# Patient Record
Sex: MALE | Race: BLACK OR AFRICAN AMERICAN | NOT HISPANIC OR LATINO | Employment: FULL TIME | URBAN - METROPOLITAN AREA
[De-identification: names, ages, dates, MRNs, and addresses within clinical notes are randomized per-mention and may not be internally consistent; named-entity substitution may affect disease eponyms.]

---

## 2017-09-28 ENCOUNTER — HOSPITAL ENCOUNTER (EMERGENCY)
Facility: OTHER | Age: 37
Discharge: HOME | End: 2017-09-28
Attending: EMERGENCY MEDICINE

## 2017-09-28 VITALS
RESPIRATION RATE: 18 BRPM | HEIGHT: 71 IN | SYSTOLIC BLOOD PRESSURE: 114 MMHG | TEMPERATURE: 99 F | BODY MASS INDEX: 33.6 KG/M2 | DIASTOLIC BLOOD PRESSURE: 75 MMHG | HEART RATE: 57 BPM | WEIGHT: 240 LBS | OXYGEN SATURATION: 96 %

## 2017-09-28 DIAGNOSIS — M54.42 ACUTE BILATERAL LOW BACK PAIN WITH LEFT-SIDED SCIATICA: Primary | ICD-10-CM

## 2017-09-28 PROCEDURE — 99283 EMERGENCY DEPT VISIT LOW MDM: CPT

## 2017-09-28 RX ORDER — METHOCARBAMOL 500 MG/1
1000 TABLET, FILM COATED ORAL 4 TIMES DAILY PRN
Qty: 30 TABLET | Refills: 0 | Status: SHIPPED | OUTPATIENT
Start: 2017-09-28 | End: 2017-10-03

## 2017-09-28 RX ORDER — DICLOFENAC SODIUM 50 MG/1
50 TABLET, DELAYED RELEASE ORAL 3 TIMES DAILY PRN
Qty: 24 TABLET | Refills: 0 | Status: SHIPPED | OUTPATIENT
Start: 2017-09-28 | End: 2018-03-22

## 2017-09-28 NOTE — ED PROVIDER NOTES
Encounter Date: 9/28/2017       History     Chief Complaint   Patient presents with    Back Pain     radiates down left leg, relieved with rest      A 37-year-old male who presents to the ED with left lower back pain radiates into left thigh ×1 week.  Patient reports some tingling to left thigh.  He denies numbness.  Patient denies injury.  Patient denies bowel or bladder incontinence.  Pain is a 6/10.  Pain is relieved with rest.  Pain is described as a sharp pain.          Review of patient's allergies indicates:  No Known Allergies  Past Medical History:   Diagnosis Date    Hypertension      History reviewed. No pertinent surgical history.  Family History   Problem Relation Age of Onset    Hypertension Father      Social History   Substance Use Topics    Smoking status: Never Smoker    Smokeless tobacco: Never Used    Alcohol use Yes      Comment: socailly      Review of Systems   Constitutional: Negative.  Negative for activity change, appetite change and fever.   HENT: Negative.  Negative for congestion.    Eyes: Negative.    Respiratory: Negative.  Negative for chest tightness and shortness of breath.    Gastrointestinal: Negative for abdominal distention and constipation.   Endocrine: Negative.    Genitourinary: Negative.  Negative for decreased urine volume, difficulty urinating, dysuria and penile swelling.   Musculoskeletal: Positive for back pain.   Skin: Negative.    Neurological: Negative.  Negative for dizziness, tremors and weakness.   Psychiatric/Behavioral: Negative.    All other systems reviewed and are negative.      Physical Exam     Initial Vitals [09/28/17 0944]   BP Pulse Resp Temp SpO2   114/75 (!) 57 18 98.5 °F (36.9 °C) 96 %      MAP       88         Physical Exam    Nursing note and vitals reviewed.  Constitutional: Vital signs are normal. He appears well-developed.  Non-toxic appearance. He does not have a sickly appearance.   HENT:   Head: Normocephalic.   Right Ear: External ear  and ear canal normal.   Left Ear: External ear and ear canal normal.   Nose: Nose normal.   Mouth/Throat: Uvula is midline, oropharynx is clear and moist and mucous membranes are normal.   Eyes: Conjunctivae are normal. Pupils are equal, round, and reactive to light.   Neck: Normal range of motion.   Cardiovascular: Normal rate, regular rhythm and normal heart sounds.   Pulmonary/Chest: Effort normal.   Abdominal: Soft. Normal appearance and bowel sounds are normal. He exhibits no distension. There is no tenderness. There is no rebound.   Musculoskeletal:        Lumbar back: He exhibits tenderness and pain.   Full range of motion of all extremities.  Lumbar spine with full range of motion with pain.  Positive paraspinous tenderness on the left.,  Straight leg raise positive on the left.  Reflexes +2 bilaterally  Distal pulse +2 bilaterally   Neurological: He is alert and oriented to person, place, and time. He has normal strength. Gait normal.   Skin: Skin is warm and dry. Capillary refill takes less than 2 seconds.   Psychiatric: He has a normal mood and affect. His speech is normal and behavior is normal. Judgment and thought content normal. Cognition and memory are normal.         ED Course   Procedures  Labs Reviewed - No data to display          Medical Decision Making:   Initial Assessment:   A 37-year-old male who presents to the ED with lower back pain that radiates into left eye ×1 week.  Patient reports some tingling to left thigh.  He denies numbness.  No bowel or bladder incontinence.  No evidence of saddle anesthesia.  Patient denies injury.  Patient states he was seen at another ER and given an injection last week but did not wait on his prescriptions.  Pain is a 6 out of 10.  Pain is described as a sharp pain.  Pain is relieved with rest.   Differential Diagnosis:   Acute sciatica, lumbar sprain.  ED Management:  Physical exam.  Patient offered injection.  Patient declined.  Patient states he rather  be sent home with prescriptions.   Patient to be discharged home with diclofenac and robaxin. Follow-up with PCP in 2-3 days.               Attending Attestation:     Physician Attestation Statement for NP/PA:   I discussed this assessment and plan of this patient with the NP/PA, but I did not personally examine the patient. The face to face encounter was performed by the NP/PA.                  ED Course      Clinical Impression:   The encounter diagnosis was Acute bilateral low back pain with left-sided sciatica.                           GENE Zelaya  09/28/17 1151       Janice Tomlinson MD  09/28/17 1200

## 2018-02-09 ENCOUNTER — CLINICAL SUPPORT (OUTPATIENT)
Dept: URGENT CARE | Facility: CLINIC | Age: 38
End: 2018-02-09

## 2018-02-09 DIAGNOSIS — Z02.83 ENCOUNTER FOR DRUG SCREENING: Primary | ICD-10-CM

## 2018-02-09 PROCEDURE — 80305 DRUG TEST PRSMV DIR OPT OBS: CPT | Mod: S$GLB,,, | Performed by: PREVENTIVE MEDICINE

## 2018-11-14 ENCOUNTER — HOSPITAL ENCOUNTER (EMERGENCY)
Facility: HOSPITAL | Age: 38
Discharge: HOME | End: 2018-11-14
Attending: EMERGENCY MEDICINE

## 2018-11-14 VITALS
SYSTOLIC BLOOD PRESSURE: 158 MMHG | BODY MASS INDEX: 36.11 KG/M2 | DIASTOLIC BLOOD PRESSURE: 79 MMHG | TEMPERATURE: 98 F | OXYGEN SATURATION: 100 % | HEART RATE: 68 BPM | HEIGHT: 69 IN | RESPIRATION RATE: 20 BRPM | WEIGHT: 243.81 LBS

## 2018-11-14 DIAGNOSIS — R30.0 DYSURIA: Primary | ICD-10-CM

## 2018-11-14 LAB
BILIRUBIN, POC UA: NEGATIVE
BLOOD, POC UA: NEGATIVE
CLARITY, POC UA: CLEAR
COLOR, POC UA: YELLOW
GLUCOSE, POC UA: NEGATIVE
KETONES, POC UA: NEGATIVE
LEUKOCYTE EST, POC UA: NEGATIVE
NITRITE, POC UA: NEGATIVE
PH UR STRIP: 6.5 [PH]
PROTEIN, POC UA: NEGATIVE
SPECIFIC GRAVITY, POC UA: 1.02
UROBILINOGEN, POC UA: 0.2 E.U./DL

## 2018-11-14 PROCEDURE — 87661 TRICHOMONAS VAGINALIS AMPLIF: CPT

## 2018-11-14 PROCEDURE — 25000003 PHARM REV CODE 250: Performed by: NURSE PRACTITIONER

## 2018-11-14 PROCEDURE — 96372 THER/PROPH/DIAG INJ SC/IM: CPT

## 2018-11-14 PROCEDURE — 99284 EMERGENCY DEPT VISIT MOD MDM: CPT | Mod: 25

## 2018-11-14 PROCEDURE — 99283 EMERGENCY DEPT VISIT LOW MDM: CPT | Mod: 25

## 2018-11-14 PROCEDURE — 81003 URINALYSIS AUTO W/O SCOPE: CPT

## 2018-11-14 PROCEDURE — 63600175 PHARM REV CODE 636 W HCPCS: Performed by: NURSE PRACTITIONER

## 2018-11-14 PROCEDURE — 87491 CHLMYD TRACH DNA AMP PROBE: CPT

## 2018-11-14 RX ORDER — AZITHROMYCIN 250 MG/1
1000 TABLET, FILM COATED ORAL ONCE
Status: COMPLETED | OUTPATIENT
Start: 2018-11-14 | End: 2018-11-14

## 2018-11-14 RX ORDER — CEFTRIAXONE 250 MG/1
250 INJECTION, POWDER, FOR SOLUTION INTRAMUSCULAR; INTRAVENOUS
Status: COMPLETED | OUTPATIENT
Start: 2018-11-14 | End: 2018-11-14

## 2018-11-14 RX ADMIN — AZITHROMYCIN MONOHYDRATE 1000 MG: 250 TABLET ORAL at 04:11

## 2018-11-14 RX ADMIN — CEFTRIAXONE SODIUM 250 MG: 250 INJECTION, POWDER, FOR SOLUTION INTRAMUSCULAR; INTRAVENOUS at 04:11

## 2018-11-14 NOTE — ED PROVIDER NOTES
Encounter Date: 11/14/2018       History     Chief Complaint   Patient presents with    Urinary Frequency     onset x 2 days with right flank pain that resolved.  Denies any fever, n/v, diarrhea, chills.  Denies taking anything for symptoms.     A 38-year-old female who presents to the ED with complaints of urinary frequency and dysuria x2 days.  Patient reports right flank pain that resolved.  Patient denies fever, chills nausea vomiting.  Patient reports having unprotected sex.  Patient denies penile discharge.      The history is provided by the patient.   Urinary Frequency   This is a new problem. The current episode started 2 days ago. The problem has been gradually worsening. Pertinent negatives include no abdominal pain. Nothing aggravates the symptoms. Nothing relieves the symptoms.     Review of patient's allergies indicates:  No Known Allergies  Past Medical History:   Diagnosis Date    Hypertension      No past surgical history on file.  Family History   Problem Relation Age of Onset    Hypertension Father      Social History     Tobacco Use    Smoking status: Never Smoker    Smokeless tobacco: Never Used   Substance Use Topics    Alcohol use: Yes     Comment: socailly     Drug use: No     Review of Systems   Constitutional: Negative.  Negative for activity change.   HENT: Negative.  Negative for congestion.    Eyes: Negative.  Negative for discharge.   Respiratory: Negative.    Cardiovascular: Negative.    Gastrointestinal: Negative.  Negative for abdominal pain, nausea and vomiting.   Genitourinary: Positive for dysuria and frequency.   Musculoskeletal: Negative.    Skin: Negative.    Neurological: Negative.  Negative for weakness.   Hematological: Does not bruise/bleed easily.   Psychiatric/Behavioral: Negative.    All other systems reviewed and are negative.      Physical Exam     Initial Vitals [11/14/18 1553]   BP Pulse Resp Temp SpO2   (!) 142/72 77 18 97.6 °F (36.4 °C) 98 %      MAP        --         Physical Exam    Nursing note and vitals reviewed.  Constitutional: Vital signs are normal. He appears well-developed and well-nourished.   HENT:   Right Ear: External ear normal.   Left Ear: External ear normal.   Nose: Nose normal.   Mouth/Throat: Oropharynx is clear and moist.   Eyes: Conjunctivae and lids are normal. Pupils are equal, round, and reactive to light.   Neck: Normal range of motion. Neck supple.   Cardiovascular: Normal rate, regular rhythm, S1 normal, S2 normal and normal heart sounds.   Pulmonary/Chest: Effort normal and breath sounds normal.   Abdominal: Soft. Normal appearance and bowel sounds are normal. There is no tenderness.   Genitourinary: Penis normal. Right testis shows no tenderness. Cremasteric reflex is not absent on the right side. Left testis shows no tenderness. Cremasteric reflex is not absent on the left side. Uncircumcised. No penile tenderness. No discharge found.   Genitourinary Comments: Chaperone present   Musculoskeletal: Normal range of motion.   Neurological: He is alert and oriented to person, place, and time. He has normal strength.   Skin: Skin is warm, dry and intact.   Psychiatric: He has a normal mood and affect. His speech is normal and behavior is normal. Judgment normal.         ED Course   Procedures  Labs Reviewed   POCT URINALYSIS W/O SCOPE - Abnormal; Notable for the following components:       Result Value    Glucose, UA Negative (*)     Bilirubin, UA Negative (*)     Ketones, UA Negative (*)     Blood, UA Negative (*)     Protein, UA Negative (*)     Nitrite, UA Negative (*)     Leukocytes, UA Negative (*)     All other components within normal limits   C. TRACHOMATIS/N. GONORRHOEAE BY AMP DNA   TRICHOMONAS VAGINALIS, RNA,QUAL, URINE   POCT URINALYSIS W/O SCOPE   POCT GLUCOSE   POCT GLUCOSE          Imaging Results    None          Medical Decision Making:   Initial Assessment:   A 38-year-old male who presents to the ED with complaints of  urinary frequency and dysuria  x2 days.  Patient reports having unprotected sex.  Patient denies penile discharge  Differential Diagnosis:   Urinary tract infection, STD exposure  Clinical Tests:   Lab Tests: Ordered and Reviewed  ED Management:  Physical exam.  Urinalysis.  GC chlamydia pending.  Medicated with Ceftriaxone 250 mg IM and Azithromycin 1 g p.o..  Follow-up with PCP in 1-2 days.  Return ED for worsening of symptoms.                      Clinical Impression:   The encounter diagnosis was Dysuria.                             GENE Zelaya  11/14/18 8048

## 2018-11-15 LAB
C TRACH DNA SPEC QL NAA+PROBE: NOT DETECTED
N GONORRHOEA DNA SPEC QL NAA+PROBE: NOT DETECTED
POCT GLUCOSE: 108 MG/DL (ref 70–110)

## 2018-11-19 LAB
T VAGINALIS RRNA SPEC QL NAA+PROBE: NOT DETECTED
TRICHOMONAS VAGINALIS RNA, QUAL, SOURCE: NORMAL

## 2018-11-21 ENCOUNTER — HOSPITAL ENCOUNTER (EMERGENCY)
Facility: HOSPITAL | Age: 38
Discharge: HOME | End: 2018-11-21
Attending: EMERGENCY MEDICINE

## 2018-11-21 VITALS
RESPIRATION RATE: 18 BRPM | TEMPERATURE: 98 F | OXYGEN SATURATION: 98 % | HEIGHT: 70 IN | BODY MASS INDEX: 34.79 KG/M2 | HEART RATE: 64 BPM | SYSTOLIC BLOOD PRESSURE: 132 MMHG | DIASTOLIC BLOOD PRESSURE: 78 MMHG | WEIGHT: 243 LBS

## 2018-11-21 DIAGNOSIS — R35.0 URINARY FREQUENCY: Primary | ICD-10-CM

## 2018-11-21 LAB
BILIRUBIN, POC UA: NEGATIVE
BLOOD, POC UA: NEGATIVE
CLARITY, POC UA: CLEAR
COLOR, POC UA: YELLOW
GLUCOSE, POC UA: NEGATIVE
KETONES, POC UA: NEGATIVE
LEUKOCYTE EST, POC UA: NEGATIVE
NITRITE, POC UA: NEGATIVE
PH UR STRIP: 5.5 [PH]
PROTEIN, POC UA: NEGATIVE
SPECIFIC GRAVITY, POC UA: 1.02
UROBILINOGEN, POC UA: 0.2 E.U./DL

## 2018-11-21 PROCEDURE — 99283 EMERGENCY DEPT VISIT LOW MDM: CPT

## 2018-11-21 RX ORDER — SULFAMETHOXAZOLE AND TRIMETHOPRIM 800; 160 MG/1; MG/1
1 TABLET ORAL 2 TIMES DAILY
Qty: 20 TABLET | Refills: 0 | Status: SHIPPED | OUTPATIENT
Start: 2018-11-21 | End: 2019-01-10 | Stop reason: SDUPTHER

## 2018-11-21 NOTE — ED PROVIDER NOTES
Encounter Date: 11/21/2018    SCRIBE #1 NOTE: I, Kayla Fabianmalinda, am scribing for, and in the presence of,  Toussaint Battley NP. I have scribed the following portions of the note - Other sections scribed: HPI, ROS, PE.       History     Chief Complaint   Patient presents with    Urinary Frequency     patient reports having urinary frequency.  patient reports he was treated here a week ago for the same complaint     38 y.o male presents with recurrent increased urinary frequency for 1 week. He was treated here for the same complaint last week and had normal labs. He also reports when he goes, only drops come out occasionally. He has a family hx of prostate cancer (uncle). Was treated for prostatitis several years ago.  Reports he feels now like he did when he was dx.  He denies any other symptoms.       The history is provided by the patient. No  was used.     Review of patient's allergies indicates:  No Known Allergies  Past Medical History:   Diagnosis Date    Hypertension      History reviewed. No pertinent surgical history.  Family History   Problem Relation Age of Onset    Hypertension Father      Social History     Tobacco Use    Smoking status: Never Smoker    Smokeless tobacco: Never Used   Substance Use Topics    Alcohol use: Yes     Comment: socailly     Drug use: No     Review of Systems   Constitutional: Negative for fever.   HENT: Negative for sore throat.    Respiratory: Negative for shortness of breath.    Cardiovascular: Negative for chest pain.   Gastrointestinal: Negative for abdominal pain and nausea.   Genitourinary: Positive for difficulty urinating and frequency. Negative for dysuria.   Musculoskeletal: Negative for back pain.   Skin: Negative for rash.   Neurological: Negative for weakness.   Hematological: Does not bruise/bleed easily.   All other systems reviewed and are negative.      Physical Exam     Initial Vitals [11/21/18 1339]   BP Pulse Resp Temp SpO2    133/89 63 18 97.7 °F (36.5 °C) 96 %      MAP       --         Physical Exam    Nursing note and vitals reviewed.  Constitutional: He appears well-developed and well-nourished. He is not diaphoretic.  Non-toxic appearance. He does not appear ill. No distress.   HENT:   Head: Normocephalic and atraumatic.   Eyes: Conjunctivae and EOM are normal.   Neck: Normal range of motion. Neck supple.   Cardiovascular: Normal rate, regular rhythm and normal heart sounds. Exam reveals no gallop and no friction rub.    No murmur heard.  Pulmonary/Chest: Breath sounds normal. No respiratory distress. He has no wheezes. He has no rhonchi. He has no rales. He exhibits no tenderness.   Genitourinary:   Genitourinary Comments: Patient refused prostate exam   Musculoskeletal: Normal range of motion.   Neurological: He is alert and oriented to person, place, and time. No cranial nerve deficit or sensory deficit.   Skin: Skin is warm and dry. No rash noted.   Psychiatric: He has a normal mood and affect. His behavior is normal. Judgment and thought content normal.         ED Course   Procedures  Labs Reviewed   POCT URINALYSIS W/O SCOPE - Abnormal; Notable for the following components:       Result Value    Glucose, UA Negative (*)     Bilirubin, UA Negative (*)     Ketones, UA Negative (*)     Blood, UA Negative (*)     Protein, UA Negative (*)     Nitrite, UA Negative (*)     Leukocytes, UA Negative (*)     All other components within normal limits   POCT URINALYSIS W/O SCOPE          Imaging Results    None          Medical Decision Making:   History:   Old Medical Records: I decided to obtain old medical records.  Old Records Summarized: records from previous admission(s).  Initial Assessment:   Urinary frequency  Differential Diagnosis:   STD, prostatitis  Clinical Tests:   Lab Tests: Ordered and Reviewed  ED Management:  Patient was seen in the ER last week and treated for STDs.  Gonorrhea Chlamydia cultures came back negative.   Patient will be empirically treated for prostatitis with Bactrim DS per [place] antimicrobial guidelines with instructions to refrain from 6 x 1 week, follow up with , [facility], to establish primary care within 1 week, and return to the ER as needed if symptoms worsen or fail to improve.  The patient verbalized understanding of discharge instruction and treatment plan.            Scribe Attestation:   Scribe #1: I performed the above scribed service and the documentation accurately describes the services I performed. I attest to the accuracy of the note.               Clinical Impression:     1. Urinary frequency                               Toussaint Battley III, [facility]  11/21/18 0819

## 2018-11-21 NOTE — ED NOTES
"Pt states that he was referred to urologist but states he "didn't think it was a big deal".  And never followed up, pt states his father had prostate ca with successful surgery  "

## 2019-07-30 ENCOUNTER — HOSPITAL ENCOUNTER (EMERGENCY)
Facility: HOSPITAL | Age: 39
Discharge: HOME | End: 2019-07-30
Attending: EMERGENCY MEDICINE
Payer: COMMERCIAL

## 2019-07-30 VITALS
DIASTOLIC BLOOD PRESSURE: 79 MMHG | WEIGHT: 250 LBS | BODY MASS INDEX: 37.03 KG/M2 | TEMPERATURE: 99 F | RESPIRATION RATE: 18 BRPM | HEIGHT: 69 IN | OXYGEN SATURATION: 100 % | HEART RATE: 71 BPM | SYSTOLIC BLOOD PRESSURE: 128 MMHG

## 2019-07-30 DIAGNOSIS — R52 PAIN: ICD-10-CM

## 2019-07-30 DIAGNOSIS — T14.8XXA ABRASION: ICD-10-CM

## 2019-07-30 DIAGNOSIS — S80.12XA CONTUSION OF MULTIPLE SITES OF LEFT LOWER EXTREMITY, INITIAL ENCOUNTER: Primary | ICD-10-CM

## 2019-07-30 PROCEDURE — 63600175 PHARM REV CODE 636 W HCPCS: Mod: ER | Performed by: NURSE PRACTITIONER

## 2019-07-30 PROCEDURE — 25000003 PHARM REV CODE 250: Mod: ER | Performed by: NURSE PRACTITIONER

## 2019-07-30 PROCEDURE — 90471 IMMUNIZATION ADMIN: CPT | Mod: ER | Performed by: NURSE PRACTITIONER

## 2019-07-30 PROCEDURE — 99284 EMERGENCY DEPT VISIT MOD MDM: CPT | Mod: ER

## 2019-07-30 PROCEDURE — 90715 TDAP VACCINE 7 YRS/> IM: CPT | Mod: ER | Performed by: NURSE PRACTITIONER

## 2019-07-30 RX ORDER — IBUPROFEN 600 MG/1
600 TABLET ORAL ONCE
Status: COMPLETED | OUTPATIENT
Start: 2019-07-30 | End: 2019-07-30

## 2019-07-30 RX ORDER — MELOXICAM 15 MG/1
15 TABLET ORAL DAILY PRN
Qty: 20 TABLET | Refills: 0 | Status: SHIPPED | OUTPATIENT
Start: 2019-07-30

## 2019-07-30 RX ADMIN — BACITRACIN ZINC, NEOMYCIN SULFATE, POLYMYXIN B SULFATE 1 EACH: 3.5; 5000; 4 OINTMENT TOPICAL at 02:07

## 2019-07-30 RX ADMIN — IBUPROFEN 600 MG: 600 TABLET ORAL at 02:07

## 2019-07-30 RX ADMIN — CLOSTRIDIUM TETANI TOXOID ANTIGEN (FORMALDEHYDE INACTIVATED), CORYNEBACTERIUM DIPHTHERIAE TOXOID ANTIGEN (FORMALDEHYDE INACTIVATED), BORDETELLA PERTUSSIS TOXOID ANTIGEN (GLUTARALDEHYDE INACTIVATED), BORDETELLA PERTUSSIS FILAMENTOUS HEMAGGLUTININ ANTIGEN (FORMALDEHYDE INACTIVATED), BORDETELLA PERTUSSIS PERTACTIN ANTIGEN, AND BORDETELLA PERTUSSIS FIMBRIAE 2/3 ANTIGEN 0.5 ML: 5; 2; 2.5; 5; 3; 5 INJECTION, SUSPENSION INTRAMUSCULAR at 02:07

## 2019-07-30 NOTE — ED PROVIDER NOTES
"Encounter Date: 7/30/2019       History     Chief Complaint   Patient presents with    Leg Pain     Pt states," I hurt the lower part of my left leg five days ago at work."     HPI  Review of patient's allergies indicates:  No Known Allergies  Past Medical History:   Diagnosis Date    Hypertension      History reviewed. No pertinent surgical history.  Family History   Problem Relation Age of Onset    Hypertension Father      Social History     Tobacco Use    Smoking status: Never Smoker    Smokeless tobacco: Never Used   Substance Use Topics    Alcohol use: Yes     Comment: socailly     Drug use: No     Review of Systems    Physical Exam     Initial Vitals [07/30/19 1315]   BP Pulse Resp Temp SpO2   139/80 68 16 98 °F (36.7 °C) 99 %      MAP       --         Physical Exam    ED Course   Procedures  Labs Reviewed - No data to display       Imaging Results          X-Ray Tibia Fibula 2 View Left (Final result)  Result time 07/30/19 13:38:53    Final result by Suresh Huizar III, MD (07/30/19 13:38:53)                 Narrative:    EXAMINATION:  XR TIBIA FIBULA 2 VIEW LEFT    CLINICAL HISTORY:  Pain, unspecified    FINDINGS:  Two views: No fracture dislocation bone destruction seen.      Electronically signed by: Suresh Huizar MD  Date:    07/30/2019  Time:    13:38                                                    Clinical Impression:   {Add your Clinical Impression here. If you haven't documented one yet, please pend the note, finalize a Clinical Impression, and refresh your note before signing.:[id number]}                          "

## 2019-07-30 NOTE — ED PROVIDER NOTES
"Encounter Date: 7/30/2019    SCRIBE #1 NOTE: I, Prince Banks, am scribing for, and in the presence of,  GENE Gutierres. I have scribed the following portions of the note - Other sections scribed: HPI, ROS, PE.       History     Chief Complaint   Patient presents with    Leg Pain     Pt states," I hurt the lower part of my left leg five days ago at work."     39 year old male complaining of left lower leg pain s/p injuring it at work 5 days ago. He denies weakness or numbness at this time.  He reports falling intake can. 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  He is unsure of his tetanus status.    The history is provided by the patient. No  was used.   Leg Pain    The incident occurred at work. The incident occurred several days ago (5 days ago). The quality of the pain is described as aching. The pain is at a severity of 8/10. Pertinent negatives include no numbness, no inability to bear weight, no loss of motion, no muscle weakness, no loss of sensation and no tingling. He reports no foreign bodies present. The symptoms are aggravated by bearing weight. He has tried NSAIDs for the symptoms. The treatment provided mild relief.     Review of patient's allergies indicates:  No Known Allergies  Past Medical History:   Diagnosis Date    Hypertension      History reviewed. No pertinent surgical history.  Family History   Problem Relation Age of Onset    Hypertension Father      Social History     Tobacco Use    Smoking status: Never Smoker    Smokeless tobacco: Never Used   Substance Use Topics    Alcohol use: Yes     Comment: socailly     Drug use: No     Review of Systems   Constitutional: Negative.    HENT: Negative.    Eyes: Negative.  Negative for redness.   Respiratory: Negative.  Negative for shortness of breath.    Cardiovascular: Negative.  Negative for chest pain.   Gastrointestinal: Negative.    Endocrine: Negative.    Genitourinary: Negative.    Musculoskeletal: Positive for " arthralgias (left lower leg).   Skin: Negative.  Negative for rash and wound.   Allergic/Immunologic: Negative.    Neurological: Negative.  Negative for tingling, weakness and numbness.   Hematological: Negative.    Psychiatric/Behavioral: Negative.    All other systems reviewed and are negative.      Physical Exam     Initial Vitals [07/30/19 1315]   BP Pulse Resp Temp SpO2   139/80 68 16 98 °F (36.7 °C) 99 %      MAP       --         Physical Exam    Nursing note and vitals reviewed.  Constitutional: He appears well-developed.   HENT:   Head: Atraumatic.   Right Ear: External ear normal.   Left Ear: External ear normal.   Nose: Nose normal.   Mouth/Throat: Oropharynx is clear and moist.   Eyes: Conjunctivae are normal.   Neck: Normal range of motion. Neck supple.   Cardiovascular: Normal rate, regular rhythm, S1 normal, S2 normal and normal heart sounds. Exam reveals no gallop and no friction rub.    No murmur heard.  Pulses:       Dorsalis pedis pulses are 2+ on the right side, and 2+ on the left side.   Pulmonary/Chest: Effort normal and breath sounds normal. No respiratory distress.   Abdominal: Soft. Bowel sounds are normal.   Musculoskeletal: Normal range of motion. He exhibits no edema or tenderness.        Legs:  Abrasion to left lower leg.  No drainage. No evidence of infection.  Left lower leg with full range of motion. No swelling or deformity.   Neurological: He is alert and oriented to person, place, and time.   Skin: Skin is warm and dry. No rash noted.   Psychiatric: He has a normal mood and affect.         ED Course   Procedures  Labs Reviewed - No data to display       Imaging Results    None       Imaging Results          X-Ray Tibia Fibula 2 View Left (Final result)  Result time 07/30/19 13:38:53    Final result by Suresh Huizar III, MD (07/30/19 13:38:53)                 Narrative:    EXAMINATION:  XR TIBIA FIBULA 2 VIEW LEFT    CLINICAL HISTORY:  Pain, unspecified    FINDINGS:  Two views: No  fracture dislocation bone destruction seen.      Electronically signed by: Suresh Huizar MD  Date:    07/30/2019  Time:    13:38                                Medical Decision Making:   Initial Assessment:   39 year old male complaining of left lower leg pain s/p injuring it at work 5 days ago. He denies weakness or numbness at this time.  He reports falling intake can. 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  He is unsure of his tetanus status.    Differential Diagnosis:   Contusion, fracture, abrasion  Clinical Tests:   Radiological Study: Ordered and Reviewed  ED Management:  Adacel 0.5 mg IM.  Discharge home with diclofenac p.r.n..  Apply Neosporin daily and dressed  Follow-up with PCP in 1-2 days.  Return ED for worsening of symptoms.            Scribe Attestation:   Scribe #1: I performed the above scribed service and the documentation accurately describes the services I performed. I attest to the accuracy of the note.    This document was produced by a scribe under my direction and in my presence. I agree with the content of the note and have made any necessary edits.     GENE Gutierres    07/30/2019 2:30 PM           Clinical Impression:     1. Contusion of multiple sites of left lower extremity, initial encounter    2. Pain    3. Abrasion                                   GENE Zelaya  07/30/19 8792

## 2021-07-23 ENCOUNTER — IMMUNIZATION (OUTPATIENT)
Dept: PRIMARY CARE CLINIC | Facility: CLINIC | Age: 41
End: 2021-07-23

## 2021-07-23 DIAGNOSIS — Z23 NEED FOR VACCINATION: Primary | ICD-10-CM

## 2021-07-23 PROCEDURE — 0001A COVID-19, MRNA, LNP-S, PF, 30 MCG/0.3 ML DOSE VACCINE: CPT | Mod: CV19,,, | Performed by: FAMILY MEDICINE

## 2021-07-23 PROCEDURE — 91300 COVID-19, MRNA, LNP-S, PF, 30 MCG/0.3 ML DOSE VACCINE: CPT | Mod: ,,, | Performed by: FAMILY MEDICINE

## 2021-07-23 PROCEDURE — 91300 COVID-19, MRNA, LNP-S, PF, 30 MCG/0.3 ML DOSE VACCINE: ICD-10-PCS | Mod: ,,, | Performed by: FAMILY MEDICINE

## 2021-07-23 PROCEDURE — 0001A COVID-19, MRNA, LNP-S, PF, 30 MCG/0.3 ML DOSE VACCINE: ICD-10-PCS | Mod: CV19,,, | Performed by: FAMILY MEDICINE

## 2021-08-13 ENCOUNTER — IMMUNIZATION (OUTPATIENT)
Dept: PRIMARY CARE CLINIC | Facility: CLINIC | Age: 41
End: 2021-08-13

## 2021-08-13 DIAGNOSIS — Z23 NEED FOR VACCINATION: Primary | ICD-10-CM

## 2024-04-18 DIAGNOSIS — Z98.890 S/P ORIF (OPEN REDUCTION INTERNAL FIXATION) FRACTURE: Primary | ICD-10-CM

## 2024-04-18 DIAGNOSIS — Z87.81 S/P ORIF (OPEN REDUCTION INTERNAL FIXATION) FRACTURE: Primary | ICD-10-CM

## 2024-04-19 ENCOUNTER — CLINICAL SUPPORT (OUTPATIENT)
Dept: REHABILITATION | Facility: HOSPITAL | Age: 44
End: 2024-04-19
Payer: COMMERCIAL

## 2024-04-19 DIAGNOSIS — Z74.09 IMPAIRED FUNCTIONAL MOBILITY, BALANCE, GAIT, AND ENDURANCE: ICD-10-CM

## 2024-04-19 DIAGNOSIS — M25.651 DECREASED RANGE OF RIGHT HIP MOVEMENT: ICD-10-CM

## 2024-04-19 DIAGNOSIS — R29.898 DECREASED STRENGTH OF LOWER EXTREMITY: Primary | ICD-10-CM

## 2024-04-19 PROCEDURE — 97161 PT EVAL LOW COMPLEX 20 MIN: CPT | Mod: PN

## 2024-04-19 PROCEDURE — 97110 THERAPEUTIC EXERCISES: CPT | Mod: PN
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
## 2024-04-22 PROBLEM — M25.651 DECREASED RANGE OF RIGHT HIP MOVEMENT: Status: ACTIVE | Noted: 2024-04-22

## 2024-04-22 PROBLEM — Z74.09 IMPAIRED FUNCTIONAL MOBILITY, BALANCE, GAIT, AND ENDURANCE: Status: ACTIVE | Noted: 2024-04-22

## 2024-04-22 PROBLEM — R29.898 DECREASED STRENGTH OF LOWER EXTREMITY: Status: ACTIVE | Noted: 2024-04-22

## 2024-04-24 ENCOUNTER — CLINICAL SUPPORT (OUTPATIENT)
Dept: REHABILITATION | Facility: HOSPITAL | Age: 44
End: 2024-04-24
Payer: COMMERCIAL

## 2024-04-24 DIAGNOSIS — R29.898 DECREASED STRENGTH OF LOWER EXTREMITY: Primary | ICD-10-CM

## 2024-04-24 DIAGNOSIS — M25.651 DECREASED RANGE OF RIGHT HIP MOVEMENT: ICD-10-CM

## 2024-04-24 DIAGNOSIS — Z74.09 IMPAIRED FUNCTIONAL MOBILITY, BALANCE, GAIT, AND ENDURANCE: ICD-10-CM

## 2024-04-24 PROCEDURE — 97140 MANUAL THERAPY 1/> REGIONS: CPT | Mod: PN,CQ

## 2024-04-24 PROCEDURE — 97112 NEUROMUSCULAR REEDUCATION: CPT | Mod: PN,CQ

## 2024-04-24 PROCEDURE — 97110 THERAPEUTIC EXERCISES: CPT | Mod: PN,CQ

## 2024-04-24 NOTE — PROGRESS NOTES
[facility] OUTPATIENT THERAPY AND WELLNESS   Physical Therapy Treatment Note      Name: Camilla [facility] Number: [id number]    Therapy Diagnosis:   Encounter Diagnoses   Name Primary?    Decreased strength of lower extremity Yes    Decreased range of right hip movement     Impaired functional mobility, balance, gait, and endurance      Physician: Alexi Beverly*    Visit Date: 4/24/2024    Physician Orders: PT Eval and Treat   Medical Diagnosis from Referral: Z98.890,Z87.81 (ICD-10-CM) - S/P ORIF (open reduction internal fixation) fracture   Evaluation Date: 4/19/2024  Authorization Period Expiration: 5/31/2024  Plan of Care Expiration: 8/19/2024  Progress Note Due: 5/19/2024  Date of Surgery: 3/5/2024  Visit # / Visits authorized: 1/ 12   FOTO: 1/ 3     Precautions: Standard and Weightbearing WBAT     Time In: 1100  Time Out: 1200  Total Billable Time: 60 minutes    PTA Visit #: 1/5       Subjective     Patient reports: Arrived with crutch but reports its to short for him. He continues to utilize the rollator at home. Doing his exercises every day and has no issues. Currently no pain.   He was compliant with home exercise program.  Response to previous treatment: first visit post initial evaluation   Functional change: first visit post initial evaluation     Pain: 1/10  Location: right hip      Objective      Objective Measures updated at progress report unless specified.     Treatment     Camilla received the treatments listed below:      therapeutic exercises to develop strength, endurance, ROM, and flexibility for 15 minutes including:    Supine AA flexion with towel x 20 repetitions   Supine hip marches x 20 repetitions   Supine hip abduction/adduction x 30 repetitions     manual therapy techniques: Joint mobilizations were applied to the: right hip for 15 minutes, including:    Caudal glides grade II  Lateral glides grade II   Long axis distraction grade III     neuromuscular re-education activities to  improve: Balance, Coordination, Kinesthetic, Sense, and Proprioception for 30 minutes. The following activities were included:    Bridges 3 x 10 repetitions   Quad sets 5 seconds holds 3 x 10 repetitions   LAQ's 3 x 10 repetitions 5 seconds holds   Standing straight leg raises x 3 minutes   Weight shifting x 2 minutes   Standing left hip flexion to increase weight tolerance on right lower extremity x 15 repetitions   Standing right lower extremity hip abduction 3 x 10 repetitions   Standing right lower extremity hip extension 3 x 10 repetitions        therapeutic activities to improve functional performance for 0  minutes, including:      gait training to improve functional mobility and safety for 0  minutes, including:      Patient Education and Home Exercises       Education provided:   - Home Exercise Program  -Healing     Written Home Exercises Provided: yes. Exercises were reviewed and Camilla was able to demonstrate them prior to the end of the session.  Camilla demonstrated good  understanding of the education provided. See Electronic Medical Record under Patient Instructions for exercises provided during therapy sessions    Assessment     Camilla noted with good tolerance to treatment. Performed manual intervention above to improve joint range of motion. He initially noted with difficulty performing hip flexion but improved as exercise progressed. Incorporated exercises to improve weight tolerance to right lower extremity with minimal pain noted. Camilla arrived ambulating on single axillary crutch using proper assistive device sequencing. Camilla will continue to benefit from skilled Physical Therapy to maximize range of motion and strength gains as able.      Camilla Is progressing well towards his goals.   Patient prognosis is Good.     Patient will continue to benefit from skilled outpatient physical therapy to address the deficits listed in the problem list box on initial evaluation, provide pt/family  education and to maximize pt's level of independence in the home and community environment.     Patient's spiritual, cultural and educational needs considered and pt agreeable to plan of care and goals.     Anticipated barriers to physical therapy: none    Goals:        STG  Weeks/Visits Date Established  Goal Status    Patient will ambulate with good gait mechanics and weight shifting with LRAD to improve functional mobility  5 weeks/ 10 visits  4/19/2024    In progress 4/24/2024    2. Patient will increase R hip AROM flexion to >/= 100 deg and abduction >/= 40 deg to improve bed mobility  5 weeks/ 10 visits  4/19/2024     In progress 4/24/2024   3. Patient will perform sit <> stand with equal weight bearing to improve transfers  5 weeks/ 10 visits  4/19/2024     In progress 4/24/2024   4. Patient will perform SLR without quad lag to improve return to PLOF  5 weeks/ 10 visits  4/19/2024     In progress 4/24/2024   5.Pt will demonstrate and verbalize compliance and independence with HEP to improve therapy outcomes  5 weeks/ 10 visits  4/19/2024     In progress 4/24/2024   6. Patient will report </= 5/10 for at worst pain to improve quality of life  5 weeks/ 10 visits 4/19/2024     In progress 4/24/2024      LTG Weeks/Visits Date Established  Goal Status    1.Patient will increase R hip AROM flexion to >/= 110 deg and abduction >/= 50 deg to improve bed mobility  10 weeks/ 20 visits  4/19/2024        2. Patient will perform sit <> stand without UE support with equal weight bearing to improve transfers  10 weeks/ 20 visits  4/19/2024           3.Patient will perform stairs with David to improve his ability to return to work  10 weeks/ 20 visits  4/19/2024        4.Patient will report </= 3/10 for at worst pain to improve quality of life  10 weeks/ 20 visits  4/19/2024        5.Patient will ambulate with independence with good weight shifting to improve community mobility 10 weeks/ 20 visits  4/19/2024             Plan      Progress as per patients tolerance.     Isabel Thompson, PTA

## 2024-05-01 ENCOUNTER — CLINICAL SUPPORT (OUTPATIENT)
Dept: REHABILITATION | Facility: HOSPITAL | Age: 44
End: 2024-05-01
Payer: COMMERCIAL

## 2024-05-01 ENCOUNTER — DOCUMENTATION ONLY (OUTPATIENT)
Dept: REHABILITATION | Facility: HOSPITAL | Age: 44
End: 2024-05-01

## 2024-05-01 DIAGNOSIS — M25.651 DECREASED RANGE OF RIGHT HIP MOVEMENT: ICD-10-CM

## 2024-05-01 DIAGNOSIS — R29.898 DECREASED STRENGTH OF LOWER EXTREMITY: Primary | ICD-10-CM

## 2024-05-01 DIAGNOSIS — Z74.09 IMPAIRED FUNCTIONAL MOBILITY, BALANCE, GAIT, AND ENDURANCE: ICD-10-CM

## 2024-05-01 PROCEDURE — 97112 NEUROMUSCULAR REEDUCATION: CPT | Mod: PN,CQ

## 2024-05-01 PROCEDURE — 97110 THERAPEUTIC EXERCISES: CPT | Mod: PN,CQ

## 2024-05-01 NOTE — PROGRESS NOTES
[facility] OUTPATIENT THERAPY AND WELLNESS   Physical Therapy Treatment Note      Name: Camilla [facility] Number: [id number]    Therapy Diagnosis:   Encounter Diagnoses   Name Primary?    Decreased strength of lower extremity Yes    Decreased range of right hip movement     Impaired functional mobility, balance, gait, and endurance      Physician: Alexi Beverly*    Visit Date: 5/1/2024    Physician Orders: PT Eval and Treat   Medical Diagnosis from Referral: Z98.890,Z87.81 (ICD-10-CM) - S/P ORIF (open reduction internal fixation) fracture   Evaluation Date: 4/19/2024  Authorization Period Expiration: 5/31/2024  Plan of Care Expiration: 8/19/2024  Progress Note Due: 5/19/2024  Date of Surgery: 3/5/2024  Visit # / Visits authorized: 2/ 12   FOTO: 1/ 3     Precautions: Standard and Weightbearing WBAT     Time In: 1100  Time Out: 1145  Total Billable Time: 45 minutes    PTA Visit #: 2/5       Subjective     Patient reports: tightness after sitting for long periods of time, but doing well.   He was compliant with home exercise program.  Response to previous treatment: no issues stated   Functional change: ongoing     Pain: 1/10  Location: right hip      Objective      Objective Measures updated at progress report unless specified.     Treatment     Camilla received the treatments listed below:      therapeutic exercises to develop strength, endurance, ROM, and flexibility for 15 minutes including:    Supine AA flexion with towel x 20 repetitions   Supine hip marches x 20 repetitions   Supine hip abduction/adduction x 30 repetitions   Nustep x 9 minutes     manual therapy techniques: Joint mobilizations were applied to the: right hip for 0 minutes, including:    Caudal glides grade II  Lateral glides grade II   Long axis distraction grade III     neuromuscular re-education activities to improve: Balance, Coordination, Kinesthetic, Sense, and Proprioception for 30 minutes. The following activities were  included:    Bridges 3 x 10 repetitions   Supine clams Yellow theraband 3 x 10   Quad sets 5 seconds holds 3 x 10 repetitions   LAQ's 3 x 10 2# repetitions 5 seconds holds   Mini squats x 15   Standing left hip flexion to increase weight tolerance on right lower extremity x 15 repetitions   Standing right lower extremity hip abduction 3 x 10 repetitions   Standing right lower extremity hip extension 3 x 10 repetitions        therapeutic activities to improve functional performance for 0  minutes, including:      gait training to improve functional mobility and safety for 0  minutes, including:      Patient Education and Home Exercises       Education provided:   - Home Exercise Program  -Healing     Written Home Exercises Provided: yes. Exercises were reviewed and Camilla was able to demonstrate them prior to the end of the session.  Camilla demonstrated good  understanding of the education provided. See Electronic Medical Record under Patient Instructions for exercises provided during therapy sessions    Assessment     Ambulating with single axillary crutch while increasing WBAT. Session focused on strength and WB tolerance in closed chain. Encouraged patient to focus on weight shifting to Right Lower Extremity as tolerated with gait and exercises. Continue to progress as tolerated.    Camilla Is progressing well towards his goals.   Patient prognosis is Good.     Patient will continue to benefit from skilled outpatient physical therapy to address the deficits listed in the problem list box on initial evaluation, provide pt/family education and to maximize pt's level of independence in the home and community environment.     Patient's spiritual, cultural and educational needs considered and pt agreeable to plan of care and goals.     Anticipated barriers to physical therapy: none    Goals:        STG  Weeks/Visits Date Established  Goal Status    Patient will ambulate with good gait mechanics and weight shifting with  LRAD to improve functional mobility  5 weeks/ 10 visits  4/19/2024    In progress 5/1/2024    2. Patient will increase R hip AROM flexion to >/= 100 deg and abduction >/= 40 deg to improve bed mobility  5 weeks/ 10 visits  4/19/2024     In progress 5/1/2024   3. Patient will perform sit <> stand with equal weight bearing to improve transfers  5 weeks/ 10 visits  4/19/2024     In progress 5/1/2024   4. Patient will perform SLR without quad lag to improve return to PLOF  5 weeks/ 10 visits  4/19/2024     In progress 5/1/2024   5.Pt will demonstrate and verbalize compliance and independence with HEP to improve therapy outcomes  5 weeks/ 10 visits  4/19/2024     In progress 5/1/2024   6. Patient will report </= 5/10 for at worst pain to improve quality of life  5 weeks/ 10 visits 4/19/2024     In progress 5/1/2024      LTG Weeks/Visits Date Established  Goal Status    1.Patient will increase R hip AROM flexion to >/= 110 deg and abduction >/= 50 deg to improve bed mobility  10 weeks/ 20 visits  4/19/2024        2. Patient will perform sit <> stand without UE support with equal weight bearing to improve transfers  10 weeks/ 20 visits  4/19/2024           3.Patient will perform stairs with David to improve his ability to return to work  10 weeks/ 20 visits  4/19/2024        4.Patient will report </= 3/10 for at worst pain to improve quality of life  10 weeks/ 20 visits  4/19/2024        5.Patient will ambulate with independence with good weight shifting to improve community mobility 10 weeks/ 20 visits  4/19/2024             Plan     Progress as per patients tolerance.     Nemo Hughes, PTA

## 2024-05-01 NOTE — PROGRESS NOTES
PT/PTA met face to face to discuss pt's treatment plan and progress towards established goals. Pt will be seen by a physical therapist minimally every 6th visit or every 30 days.      Nemo Hughes PTA

## 2024-05-03 ENCOUNTER — CLINICAL SUPPORT (OUTPATIENT)
Dept: REHABILITATION | Facility: HOSPITAL | Age: 44
End: 2024-05-03
Payer: COMMERCIAL

## 2024-05-03 DIAGNOSIS — R29.898 DECREASED STRENGTH OF LOWER EXTREMITY: Primary | ICD-10-CM

## 2024-05-03 DIAGNOSIS — Z74.09 IMPAIRED FUNCTIONAL MOBILITY, BALANCE, GAIT, AND ENDURANCE: ICD-10-CM

## 2024-05-03 DIAGNOSIS — M25.651 DECREASED RANGE OF RIGHT HIP MOVEMENT: ICD-10-CM

## 2024-05-03 PROCEDURE — 97110 THERAPEUTIC EXERCISES: CPT | Mod: PN

## 2024-05-03 PROCEDURE — 97112 NEUROMUSCULAR REEDUCATION: CPT | Mod: PN

## 2024-05-03 PROCEDURE — 97116 GAIT TRAINING THERAPY: CPT | Mod: PN

## 2024-05-03 PROCEDURE — 97530 THERAPEUTIC ACTIVITIES: CPT | Mod: PN

## 2024-05-03 NOTE — PROGRESS NOTES
[facility]   Physical Therapy Treatment Note      Name: Camilla [facility] Number: [id number]    Therapy Diagnosis:   Encounter Diagnoses   Name Primary?    Decreased strength of lower extremity Yes    Decreased range of right hip movement     Impaired functional mobility, balance, gait, and endurance      Physician: Alexi Beverly*    Visit Date: 5/3/2024    Physician Orders: PT Eval and Treat   Medical Diagnosis from Referral: Z98.890,Z87.81 (ICD-10-CM) - S/P ORIF (open reduction internal fixation) fracture   Evaluation Date: 4/19/2024  Authorization Period Expiration: 5/31/2024  Plan of Care Expiration: 8/19/2024  Progress Note Due: 5/19/2024  Date of Surgery: 3/5/2024  Visit # / Visits authorized: 3 / 12   FOTO: 1/ 3     Precautions: Standard and Weightbearing WBAT     Time In: 1230   Time Out: 1335  Total Billable Time: 65 minutes    PTA Visit #: 0/5     Subjective     Patient reports: pain is minimal. Continues to get stiffness with sitting.     He was compliant with home exercise program.  Response to previous treatment: no issues stated   Functional change: ongoing     Pain: 1/10  Location: right hip      Objective      Objective Measures updated at progress report unless specified.     Treatment     Camilla received the treatments listed below:      therapeutic exercises to develop strength, endurance, ROM, and flexibility for 15 minutes including:    Upright bike x 8 minutes L2 start of tx session, seat height 9 for Hip range of motion and activation   R hip PROM flexion, abduction, adduction, ER, IR, extension 20 repetitions in each direction    Prone press ups   Supine AAROM hip abduction/adduction with slide board 20 repetitions   Supine AA flexion with towel x 20 repetitions   Supine hip marches 2 x 10 repetitions     manual therapy techniques: Joint mobilizations were applied to the: right hip for 0 minutes, including:    Caudal glides grade II  Lateral glides  grade II   Long axis distraction grade III     neuromuscular re-education activities to improve: Balance, Coordination, Kinesthetic, Sense, and Proprioception for 34 minutes. The following activities were included:     Seated glut set 15 x 3 second   Prone glut set 10 repetitions, decrease R glut activation compared to L   Supine clams Yellow theraband 2 x 10   + sidelying ER clams 10 repetitions with therapist assistance and cuing   +seated ER 2 x 10 repetitions   Standing RLE flexion with quad set 2 x 10   Standing right lower extremity hip abduction 15 repetitions   Standing right lower extremity hip extension 3 x 10 repetitions      NP:  LAQ's 3 x 10 2# repetitions 5 seconds holds   Mini squats x 15   Standing left hip flexion to increase weight tolerance on right lower extremity x 15 repetitions     therapeutic activities to improve functional performance for 8 minutes, including:    +sit to stands from elevated mat without UE support 3 x 8 repetitions   Bridging 20 repetitions     gait training to improve functional mobility and safety for 8 minutes, including:    Standing weight shifts lateral x 3 minutes   Ambulation without AD 2 x 300 ft with CGA , tactile and verbal cues for heel strike, step length, R weight shift       Patient Education and Home Exercises       Education provided:   - Home Exercise Program  -Healing     Written Home Exercises Provided: yes. Exercises were reviewed and Camilla was able to demonstrate them prior to the end of the session.  Camilla demonstrated good  understanding of the education provided. See Electronic Medical Record under Patient Instructions for exercises provided during therapy sessions    Assessment     Patient arrives with single axillary crutch without pain. He tolerated upright bike well without complaints of pain. Tx session focused on R hip range of motion and strength as well as improving weight bearing tolerance and gait mechanics. PROM performed with initial  onset of pain but pain improved with increasing repetitions. ER demonstrates weakness in a seated position but this has improved since evaluation. Added prone press ups to improve hip extension with ambulation and range of motion. He tolerated this well without complaints of pain. He demonstrates weakness of hip abductors in standing with compensation / hip hike noted so ceased after a few repetitions. He tolerated ambulation with CGA without AD well without pain. He demonstrates increased R lateral lean and hip drop on the L due to impaired hip abduction activation and strength. Overall he is progressing well and participates well.     Camilla Is progressing well towards his goals.   Patient prognosis is Good.     Patient will continue to benefit from skilled outpatient physical therapy to address the deficits listed in the problem list box on initial evaluation, provide pt/family education and to maximize pt's level of independence in the home and community environment.     Patient's spiritual, cultural and educational needs considered and pt agreeable to plan of care and goals.     Anticipated barriers to physical therapy: none    Goals:        STG  Weeks/Visits Date Established  Goal Status    Patient will ambulate with good gait mechanics and weight shifting with LRAD to improve functional mobility  5 weeks/ 10 visits  4/19/2024    In progress 5/3/2024    2. Patient will increase R hip AROM flexion to >/= 100 deg and abduction >/= 40 deg to improve bed mobility  5 weeks/ 10 visits  4/19/2024     In progress 5/3/2024   3. Patient will perform sit <> stand with equal weight bearing to improve transfers  5 weeks/ 10 visits  4/19/2024     In progress 5/3/2024   4. Patient will perform SLR without quad lag to improve return to PLOF  5 weeks/ 10 visits  4/19/2024     In progress 5/3/2024   5.Pt will demonstrate and verbalize compliance and independence with HEP to improve therapy outcomes  5 weeks/ 10 visits   4/19/2024     In progress 5/3/2024   6. Patient will report </= 5/10 for at worst pain to improve quality of life  5 weeks/ 10 visits 4/19/2024     In progress 5/3/2024      LTG Weeks/Visits Date Established  Goal Status    1.Patient will increase R hip AROM flexion to >/= 110 deg and abduction >/= 50 deg to improve bed mobility  10 weeks/ 20 visits  4/19/2024        2. Patient will perform sit <> stand without UE support with equal weight bearing to improve transfers  10 weeks/ 20 visits  4/19/2024           3.Patient will perform stairs with David to improve his ability to return to work  10 weeks/ 20 visits  4/19/2024        4.Patient will report </= 3/10 for at worst pain to improve quality of life  10 weeks/ 20 visits  4/19/2024        5.Patient will ambulate with independence with good weight shifting to improve community mobility 10 weeks/ 20 visits  4/19/2024             Plan     Progress as per patients tolerance.     Marie Zayas, PT

## 2024-05-08 ENCOUNTER — CLINICAL SUPPORT (OUTPATIENT)
Dept: REHABILITATION | Facility: HOSPITAL | Age: 44
End: 2024-05-08
Payer: COMMERCIAL

## 2024-05-08 DIAGNOSIS — Z74.09 IMPAIRED FUNCTIONAL MOBILITY, BALANCE, GAIT, AND ENDURANCE: ICD-10-CM

## 2024-05-08 DIAGNOSIS — M25.651 DECREASED RANGE OF RIGHT HIP MOVEMENT: ICD-10-CM

## 2024-05-08 DIAGNOSIS — R29.898 DECREASED STRENGTH OF LOWER EXTREMITY: Primary | ICD-10-CM

## 2024-05-08 PROCEDURE — 97112 NEUROMUSCULAR REEDUCATION: CPT | Mod: PN,CQ

## 2024-05-08 PROCEDURE — 97110 THERAPEUTIC EXERCISES: CPT | Mod: PN,CQ

## 2024-05-08 PROCEDURE — 97530 THERAPEUTIC ACTIVITIES: CPT | Mod: PN,CQ

## 2024-05-08 PROCEDURE — 97116 GAIT TRAINING THERAPY: CPT | Mod: PN,CQ

## 2024-05-08 NOTE — PROGRESS NOTES
[facility]   Physical Therapy Treatment Note      Name: Camilla [facility] Number: [id number]    Therapy Diagnosis:   Encounter Diagnoses   Name Primary?    Decreased strength of lower extremity Yes    Decreased range of right hip movement     Impaired functional mobility, balance, gait, and endurance      Physician: Alexi Beverly*    Visit Date: 5/8/2024    Physician Orders: PT Eval and Treat   Medical Diagnosis from Referral: Z98.890,Z87.81 (ICD-10-CM) - S/P ORIF (open reduction internal fixation) fracture   Evaluation Date: 4/19/2024  Authorization Period Expiration: 5/31/2024  Plan of Care Expiration: 8/19/2024  Progress Note Due: 5/19/2024  Date of Surgery: 3/5/2024  Visit # / Visits authorized: 4 / 12   FOTO: 1/ 3     Precautions: Standard and Weightbearing WBAT     Time In: 1100  Time Out: 1200  Total Billable Time: 60 minutes    PTA Visit #: 1/5     Subjective     Patient reports: doing alright, feeling it, but doing well. Cut his grass yesterday so he is sore/tired today.   He was compliant with home exercise program.  Response to previous treatment: no issues stated   Functional change: increasing activity, using crutch for long distance     Pain: 1/10  Location: right hip      Objective      Objective Measures updated at progress report unless specified.     Treatment     Camilla received the treatments listed below:      therapeutic exercises to develop strength, endurance, ROM, and flexibility for 15 minutes including:    Upright bike x 8 minutes L2 start of tx session, seat height 9 for Hip range of motion and activation    Prone press ups x 15   Supine AAROM hip abduction/adduction with slide board 20 repetitions   Supine AA flexion with towel x 20 repetitions   Supine hip marches 2 x 10 repetitions     manual therapy techniques: Joint mobilizations were applied to the: right hip for 0 minutes, including:    Caudal glides grade II  Lateral glides grade II   Long  axis distraction grade III     neuromuscular re-education activities to improve: Balance, Coordination, Kinesthetic, Sense, and Proprioception for 34 minutes. The following activities were included:     Supine clams red theraband 2 x 10   sidelying ER clams 20 repetitions   seated ER 2 x 10 repetitions   Standing RLE flexion with quad set 2 x 10   Standing right lower extremity hip abduction 20 repetitions   Standing right lower extremity hip extension 3 x 10 repetitions      therapeutic activities to improve functional performance for 8 minutes, including:    Shuttle DL 25# 3 x 10   Shuttle right single leg 12# x 10   Bridging Red Theraband x 20 repetitions     gait training to improve functional mobility and safety for 8 minutes, including:    Standing weight shifts lateral x 3 minutes   Standing right hip flexion x 20   Ambulation without AD 2 x 300 ft with CGA , tactile and verbal cues for heel strike, step length, R weight shift       Patient Education and Home Exercises       Education provided:   - Home Exercise Program  -Healing     Written Home Exercises Provided: yes. Exercises were reviewed and Camilla was able to demonstrate them prior to the end of the session.  Camilla demonstrated good  understanding of the education provided. See Electronic Medical Record under Patient Instructions for exercises provided during therapy sessions    Assessment     Camilla is showing slight improvements with weight bearing during gait. Still very challenged with active hip flexion and abduction, shuttle added to increase muscle activation. Tightness in right hip adductors. Continue to progress.     Camilla Is progressing well towards his goals.   Patient prognosis is Good.     Patient will continue to benefit from skilled outpatient physical therapy to address the deficits listed in the problem list box on initial evaluation, provide pt/family education and to maximize pt's level of independence in the home and community  environment.     Patient's spiritual, cultural and educational needs considered and pt agreeable to plan of care and goals.     Anticipated barriers to physical therapy: none    Goals:        STG  Weeks/Visits Date Established  Goal Status    Patient will ambulate with good gait mechanics and weight shifting with LRAD to improve functional mobility  5 weeks/ 10 visits  4/19/2024    In progress 5/8/2024    2. Patient will increase R hip AROM flexion to >/= 100 deg and abduction >/= 40 deg to improve bed mobility  5 weeks/ 10 visits  4/19/2024     In progress 5/8/2024   3. Patient will perform sit <> stand with equal weight bearing to improve transfers  5 weeks/ 10 visits  4/19/2024     In progress 5/8/2024   4. Patient will perform SLR without quad lag to improve return to PLOF  5 weeks/ 10 visits  4/19/2024     In progress 5/8/2024   5.Pt will demonstrate and verbalize compliance and independence with HEP to improve therapy outcomes  5 weeks/ 10 visits  4/19/2024     In progress 5/8/2024   6. Patient will report </= 5/10 for at worst pain to improve quality of life  5 weeks/ 10 visits 4/19/2024     In progress 5/8/2024      LTG Weeks/Visits Date Established  Goal Status    1.Patient will increase R hip AROM flexion to >/= 110 deg and abduction >/= 50 deg to improve bed mobility  10 weeks/ 20 visits  4/19/2024        2. Patient will perform sit <> stand without UE support with equal weight bearing to improve transfers  10 weeks/ 20 visits  4/19/2024           3.Patient will perform stairs with David to improve his ability to return to work  10 weeks/ 20 visits  4/19/2024        4.Patient will report </= 3/10 for at worst pain to improve quality of life  10 weeks/ 20 visits  4/19/2024        5.Patient will ambulate with independence with good weight shifting to improve community mobility 10 weeks/ 20 visits  4/19/2024             Plan     Progress as per patients tolerance.     Nemo Hughes, PTA

## 2024-05-10 ENCOUNTER — CLINICAL SUPPORT (OUTPATIENT)
Dept: REHABILITATION | Facility: HOSPITAL | Age: 44
End: 2024-05-10
Payer: COMMERCIAL

## 2024-05-10 DIAGNOSIS — M25.651 DECREASED RANGE OF RIGHT HIP MOVEMENT: ICD-10-CM

## 2024-05-10 DIAGNOSIS — R29.898 DECREASED STRENGTH OF LOWER EXTREMITY: Primary | ICD-10-CM

## 2024-05-10 DIAGNOSIS — Z74.09 IMPAIRED FUNCTIONAL MOBILITY, BALANCE, GAIT, AND ENDURANCE: ICD-10-CM

## 2024-05-10 PROCEDURE — 97110 THERAPEUTIC EXERCISES: CPT | Mod: PN

## 2024-05-10 PROCEDURE — 97112 NEUROMUSCULAR REEDUCATION: CPT | Mod: PN

## 2024-05-10 PROCEDURE — 97140 MANUAL THERAPY 1/> REGIONS: CPT | Mod: PN

## 2024-05-10 NOTE — PROGRESS NOTES
[facility]   Physical Therapy Treatment Note      Name: Camilla [facility] Number: [id number]    Therapy Diagnosis:   Encounter Diagnoses   Name Primary?    Decreased strength of lower extremity Yes    Decreased range of right hip movement     Impaired functional mobility, balance, gait, and endurance        Physician: Alexi Beverly*    Visit Date: 5/10/2024    Physician Orders: PT Eval and Treat   Medical Diagnosis from Referral: Z98.890,Z87.81 (ICD-10-CM) - S/P ORIF (open reduction internal fixation) fracture   Evaluation Date: 4/19/2024  Authorization Period Expiration: 5/31/2024  Plan of Care Expiration: 8/19/2024  Progress Note Due: 5/19/2024  Date of Surgery: 3/5/2024  Visit # / Visits authorized: 5 / 12   FOTO: 1 / 3     Precautions: Standard and Weightbearing WBAT     Time In: 1230  Time Out: 1335  Total Billable Time: 60 minutes    PTA Visit #: 0 / 5     Subjective     Patient reports: decreased stiffness with sitting. He is able to lay on the L side now. Reports he cut the grass Tuesday and reports some R hip pain after this. He had to weed eat, blow, push mower, and put new mulch down.     He was compliant with home exercise program.  Response to previous treatment: no issues stated   Functional change: increasing activity, using crutch for long distance     Pain: 0/10  Location: right hip      Objective      Objective Measures updated at progress report unless specified.     Treatment     Camilla received the treatments listed below:      therapeutic exercises to develop strength, endurance, ROM, and flexibility for 23 minutes including:    Upright bike x 8 minutes L2 start of tx session, seat height 9 for Hip range of motion and activation   Supine AAROM hip abduction/adduction with slide board 20 repetitions   Supine AROM flexion / hip march x 20 repetitions   Standing hip flexor stretch at chair 4 x 30 second   Unable to tolerate R LE off edge of mat   PROM hip  flexion, extension, ER, IR, abduction     manual therapy techniques: Joint mobilizations and STM were applied to the: right hip for 14 minutes, including:    Caudal glides grade II  MMW into flexion   Long axis distraction grade III   Anterior mobs grade 2, MWM into extension   STM R iliopsoas x 2 minutes minutes     NP:   Lateral glides grade II     neuromuscular re-education activities to improve: Balance, Coordination, Kinesthetic, Sense, and Proprioception for 23 minutes. The following activities were included:     Prone press ups 2 x 10   Repeated standing extensions at counter 20 repetitions , decrease better   Supine clams green theraband 2 x 10  Prone on elbows with therapist repeated knee extension 2 x 10 , decrease better   Bridging 4 x 5 repetitions     NP:  Sidelying ER clams 20 repetitions   seated ER 2 x 10 repetitions  Standing RLE flexion with quad set 2 x 10   Standing right lower extremity hip abduction 20 repetitions   Standing right lower extremity hip extension 3 x 10 repetitions      therapeutic activities to improve functional performance for 0  minutes, including:    Shuttle DL 25# 3 x 10   Shuttle right single leg 12# x 10     gait training to improve functional mobility and safety for 0 minutes, including:    Standing weight shifts lateral x 3 minutes   Standing right hip flexion x 20   Ambulation without AD 2 x 300 ft with CGA , tactile and verbal cues for heel strike, step length, R weight shift       Patient Education and Home Exercises       Education provided:   - Home Exercise Program  -Healing     Written Home Exercises Provided: yes. Exercises were reviewed and Camilla was able to demonstrate them prior to the end of the session.  Camilla demonstrated good  understanding of the education provided. See Electronic Medical Record under Patient Instructions for exercises provided during therapy sessions    Assessment     Arrives with reports of mowing the yard with increased pain since  "then possible due to "overdoing it". He also arrives without crutch 2/2 leaving it in the car. Today he had more complaints of anterior R hip pain at iliopsoas with exercises. Tx session focused on improving this pain. His pain was reproduced with activation and stretching of iliopsoas. He was unable to tolerate supine hip flexor stretch off edge of mat so adjusted to standing with chair with improved tolerance. He reported relief with prone on elbows passive knee flexion and standing extensions. Updated his HEP to focus on stretching iliopsoas and focusing on repeated lumbar extensions to also target iliopsoas. He continues to progress well.     Camilla Is progressing well towards his goals.   Patient prognosis is Good.     Patient will continue to benefit from skilled outpatient physical therapy to address the deficits listed in the problem list box on initial evaluation, provide pt/family education and to maximize pt's level of independence in the home and community environment.     Patient's spiritual, cultural and educational needs considered and pt agreeable to plan of care and goals.     Anticipated barriers to physical therapy: none    Goals:        STG  Weeks/Visits Date Established  Goal Status    Patient will ambulate with good gait mechanics and weight shifting with LRAD to improve functional mobility  5 weeks/ 10 visits  4/19/2024    In progress 5/10/2024    2. Patient will increase R hip AROM flexion to >/= 100 deg and abduction >/= 40 deg to improve bed mobility  5 weeks/ 10 visits  4/19/2024     In progress 5/10/2024   3. Patient will perform sit <> stand with equal weight bearing to improve transfers  5 weeks/ 10 visits  4/19/2024     In progress 5/10/2024   4. Patient will perform SLR without quad lag to improve return to PLOF  5 weeks/ 10 visits  4/19/2024     In progress 5/10/2024   5.Pt will demonstrate and verbalize compliance and independence with HEP to improve therapy outcomes  5 weeks/ 10 " visits  4/19/2024     In progress 5/10/2024   6. Patient will report </= 5/10 for at worst pain to improve quality of life  5 weeks/ 10 visits 4/19/2024     In progress 5/10/2024      LTG Weeks/Visits Date Established  Goal Status    1.Patient will increase R hip AROM flexion to >/= 110 deg and abduction >/= 50 deg to improve bed mobility  10 weeks/ 20 visits  4/19/2024        2. Patient will perform sit <> stand without UE support with equal weight bearing to improve transfers  10 weeks/ 20 visits  4/19/2024           3.Patient will perform stairs with David to improve his ability to return to work  10 weeks/ 20 visits  4/19/2024        4.Patient will report </= 3/10 for at worst pain to improve quality of life  10 weeks/ 20 visits  4/19/2024        5.Patient will ambulate with independence with good weight shifting to improve community mobility 10 weeks/ 20 visits  4/19/2024             Plan     Progress as per patients tolerance. Continue to address range of motion and strength deficits.     Marie Zayas, PT

## 2024-05-15 ENCOUNTER — CLINICAL SUPPORT (OUTPATIENT)
Dept: REHABILITATION | Facility: HOSPITAL | Age: 44
End: 2024-05-15
Payer: COMMERCIAL

## 2024-05-15 DIAGNOSIS — M25.651 DECREASED RANGE OF RIGHT HIP MOVEMENT: ICD-10-CM

## 2024-05-15 DIAGNOSIS — R29.898 DECREASED STRENGTH OF LOWER EXTREMITY: Primary | ICD-10-CM

## 2024-05-15 DIAGNOSIS — Z74.09 IMPAIRED FUNCTIONAL MOBILITY, BALANCE, GAIT, AND ENDURANCE: ICD-10-CM

## 2024-05-15 PROCEDURE — 97530 THERAPEUTIC ACTIVITIES: CPT | Mod: PN,CQ

## 2024-05-15 PROCEDURE — 97112 NEUROMUSCULAR REEDUCATION: CPT | Mod: PN,CQ

## 2024-05-15 PROCEDURE — 97110 THERAPEUTIC EXERCISES: CPT | Mod: PN,CQ

## 2024-05-15 NOTE — PROGRESS NOTES
[facility]   Physical Therapy Treatment Note      Name: Camilla [facility] Number: [id number]    Therapy Diagnosis:   Encounter Diagnoses   Name Primary?    Decreased strength of lower extremity Yes    Decreased range of right hip movement     Impaired functional mobility, balance, gait, and endurance        Physician: Alexi Beverly*    Visit Date: 5/15/2024    Physician Orders: PT Eval and Treat   Medical Diagnosis from Referral: Z98.890,Z87.81 (ICD-10-CM) - S/P ORIF (open reduction internal fixation) fracture   Evaluation Date: 4/19/2024  Authorization Period Expiration: 5/31/2024  Plan of Care Expiration: 8/19/2024  Progress Note Due: 5/19/2024  Date of Surgery: 3/5/2024  Visit # / Visits authorized: 6 / 12   FOTO: 1 / 3     Precautions: Standard and Weightbearing WBAT     Time In: 1100  Time Out: 1200  Total Billable Time: 60 minutes    PTA Visit #: 1 / 5     Subjective     Patient reports: took 2 tylenol before coming, his hip is better than last session.   He was compliant with home exercise program.  Response to previous treatment: no issues stated   Functional change: increasing activity, using crutch for long distance  Pain: 0/10  Location: right hip      Objective      Objective Measures updated at progress report unless specified.     Treatment     Camilla received the treatments listed below:      therapeutic exercises to develop strength, endurance, ROM, and flexibility for 15 minutes including:    Upright bike x 8 minutes L2 start of tx session, seat height 9 for Hip range of motion and activation   Supine AAROM hip abduction/adduction with slide board 20 repetitions   Supine AROM flexion / hip march x 20 repetitions     Not performed:   Standing hip flexor stretch at chair 4 x 30 second   Unable to tolerate R LE off edge of mat   PROM hip flexion, extension, ER, IR, abduction     manual therapy techniques: Joint mobilizations and STM were applied to the: right  hip for 0 minutes, including:    Caudal glides grade II  MMW into flexion   Long axis distraction grade III   Anterior mobs grade 2, MWM into extension   STM R iliopsoas x 2 minutes minutes     NP:   Lateral glides grade II     neuromuscular re-education activities to improve: Balance, Coordination, Kinesthetic, Sense, and Proprioception for 30 minutes. The following activities were included:     Prone press ups 2 x 10   Prone hip extension 2 x 10 Bilateral   Supine clams green theraband 3 x 10  Bridging Green Theraband 4 x 5 repetitions   SKFO Green Theraband 2 x 10 Bilateral   Seated hip External Rotation with ball at ankles 2 x 10   Seated hip Internal Rotation with ball at knees 2 x 10     NP:  Prone on elbows with therapist repeated knee extension 2 x 10 , decrease better   Sidelying ER clams 20 repetitions   Repeated standing extensions at counter 20 repetitions , decrease better   seated ER 2 x 10 repetitions  Standing RLE flexion with quad set 2 x 10   Standing right lower extremity hip abduction 20 repetitions   Standing right lower extremity hip extension 3 x 10 repetitions      therapeutic activities to improve functional performance for 8 minutes, including:    Forward step ups x 10   Lateral step ups x 10     Not performed:  Shuttle DL 25# 3 x 10   Shuttle right single leg 12# x 10     gait training to improve functional mobility and safety for 0 minutes, including:    Standing weight shifts lateral x 3 minutes   Standing right hip flexion x 20   Ambulation without AD 2 x 300 ft with CGA , tactile and verbal cues for heel strike, step length, R weight shift       Patient Education and Home Exercises       Education provided:   - Home Exercise Program  -Healing     Written Home Exercises Provided: yes. Exercises were reviewed and Camilla was able to demonstrate them prior to the end of the session.  Keyron demonstrated good  understanding of the education provided. See Electronic Medical Record under  Patient Instructions for exercises provided during therapy sessions    Assessment     Improved glut facilitation today with prone hip extension. Camilla continues to have increased tightness in right hip and challenged with hip abductor strength, a few exercises modified and added today to target hip complex. Progressing well with focus on hip and core stability within tolerance.     Camilla Is progressing well towards his goals.   Patient prognosis is Good.     Patient will continue to benefit from skilled outpatient physical therapy to address the deficits listed in the problem list box on initial evaluation, provide pt/family education and to maximize pt's level of independence in the home and community environment.     Patient's spiritual, cultural and educational needs considered and pt agreeable to plan of care and goals.     Anticipated barriers to physical therapy: none    Goals:        STG  Weeks/Visits Date Established  Goal Status    Patient will ambulate with good gait mechanics and weight shifting with LRAD to improve functional mobility  5 weeks/ 10 visits  4/19/2024    In progress 5/15/2024    2. Patient will increase R hip AROM flexion to >/= 100 deg and abduction >/= 40 deg to improve bed mobility  5 weeks/ 10 visits  4/19/2024     In progress 5/15/2024   3. Patient will perform sit <> stand with equal weight bearing to improve transfers  5 weeks/ 10 visits  4/19/2024     In progress 5/15/2024   4. Patient will perform SLR without quad lag to improve return to PLOF  5 weeks/ 10 visits  4/19/2024     In progress 5/15/2024   5.Pt will demonstrate and verbalize compliance and independence with HEP to improve therapy outcomes  5 weeks/ 10 visits  4/19/2024     In progress 5/15/2024   6. Patient will report </= 5/10 for at worst pain to improve quality of life  5 weeks/ 10 visits 4/19/2024     In progress 5/15/2024      LTG Weeks/Visits Date Established  Goal Status    1.Patient will increase R hip AROM  flexion to >/= 110 deg and abduction >/= 50 deg to improve bed mobility  10 weeks/ 20 visits  4/19/2024        2. Patient will perform sit <> stand without UE support with equal weight bearing to improve transfers  10 weeks/ 20 visits  4/19/2024           3.Patient will perform stairs with David to improve his ability to return to work  10 weeks/ 20 visits  4/19/2024        4.Patient will report </= 3/10 for at worst pain to improve quality of life  10 weeks/ 20 visits  4/19/2024        5.Patient will ambulate with independence with good weight shifting to improve community mobility 10 weeks/ 20 visits  4/19/2024             Plan     Progress as per patients tolerance. Continue to address range of motion and strength deficits.     Nemo Hughes, PTA

## 2024-05-22 ENCOUNTER — CLINICAL SUPPORT (OUTPATIENT)
Dept: REHABILITATION | Facility: HOSPITAL | Age: 44
End: 2024-05-22
Payer: COMMERCIAL

## 2024-05-22 DIAGNOSIS — R29.898 DECREASED STRENGTH OF LOWER EXTREMITY: Primary | ICD-10-CM

## 2024-05-22 DIAGNOSIS — Z74.09 IMPAIRED FUNCTIONAL MOBILITY, BALANCE, GAIT, AND ENDURANCE: ICD-10-CM

## 2024-05-22 DIAGNOSIS — M25.651 DECREASED RANGE OF RIGHT HIP MOVEMENT: ICD-10-CM

## 2024-05-22 PROCEDURE — 97112 NEUROMUSCULAR REEDUCATION: CPT | Mod: PN,CQ

## 2024-05-22 PROCEDURE — 97530 THERAPEUTIC ACTIVITIES: CPT | Mod: PN,CQ

## 2024-05-22 PROCEDURE — 97110 THERAPEUTIC EXERCISES: CPT | Mod: PN,CQ

## 2024-05-22 NOTE — PROGRESS NOTES
[facility]   Physical Therapy Treatment Note      Name: Camilla [facility] Number: [id number]    Therapy Diagnosis:   Encounter Diagnoses   Name Primary?    Decreased strength of lower extremity Yes    Decreased range of right hip movement     Impaired functional mobility, balance, gait, and endurance        Physician: Alexi Beverly*    Visit Date: 5/22/2024    Physician Orders: PT Eval and Treat   Medical Diagnosis from Referral: Z98.890,Z87.81 (ICD-10-CM) - S/P ORIF (open reduction internal fixation) fracture   Evaluation Date: 4/19/2024  Authorization Period Expiration: 5/31/2024  Plan of Care Expiration: 8/19/2024  Progress Note Due: 5/19/2024  Date of Surgery: 3/5/2024  Visit # / Visits authorized: 7 / 12   FOTO: 1 / 3     Precautions: Standard and Weightbearing WBAT     Time In: 1100  Time Out: 1200  Total Billable Time: 60 minutes    PTA Visit #: 2 / 5     Subjective     Patient reports: doing better, walking with one crutch upon arrival.   He was compliant with home exercise program.  Response to previous treatment: no issues stated   Functional change: increasing activity, using single crutch for long distance  Pain: 0/10  Location: right hip      Objective      Objective Measures updated at progress report unless specified.     Treatment     Camilla received the treatments listed below:      therapeutic exercises to develop strength, endurance, ROM, and flexibility for 15 minutes including:    Upright bike x 8 minutes L2 start of tx session, seat height 9 for Hip range of motion and activation   Supine AROM Double Knee To Chest with Physioball x 30 repetitions     manual therapy techniques: Joint mobilizations and STM were applied to the: right hip for 0 minutes, including:    Caudal glides grade II  MMW into flexion   Long axis distraction grade III   Anterior mobs grade 2, MWM into extension   STM R iliopsoas x 2 minutes minutes     NP:   Lateral glides grade II      neuromuscular re-education activities to improve: Balance, Coordination, Kinesthetic, Sense, and Proprioception for 30 minutes. The following activities were included:     Prone press ups 2 x 10   Prone hip extension 2 x 10 Bilateral   Supine clams green theraband 3 x 10  Bridging Green Theraband 4 x 5 repetitions   SKFO Green Theraband 2 x 10 Bilateral   Seated hip External Rotation 2 x 10   Seated hip Internal Rotation 2 x 10     NP:  Prone on elbows with therapist repeated knee extension 2 x 10 , decrease better   Sidelying ER clams 20 repetitions   Repeated standing extensions at counter 20 repetitions , decrease better   seated ER 2 x 10 repetitions  Standing RLE flexion with quad set 2 x 10   Standing right lower extremity hip abduction 20 repetitions   Standing right lower extremity hip extension 3 x 10 repetitions      therapeutic activities to improve functional performance for 15 minutes, including:    Forward step ups 2 x 10   Lateral step ups 2 x 10   Shuttle DL 25# 3 x 10   Shuttle right single leg 12# x 10     gait training to improve functional mobility and safety for 0 minutes, including:    Standing weight shifts lateral x 3 minutes   Standing right hip flexion x 20   Ambulation without AD 2 x 300 ft with CGA , tactile and verbal cues for heel strike, step length, R weight shift       Patient Education and Home Exercises       Education provided:   - Home Exercise Program  -Healing     Written Home Exercises Provided: yes. Exercises were reviewed and Camilla was able to demonstrate them prior to the end of the session.  Camilla demonstrated good  understanding of the education provided. See Electronic Medical Record under Patient Instructions for exercises provided during therapy sessions    Assessment     Camilla is progressing well with improved muscular strengthening in his right hip. Gait continues to have reduced stance time on Right Lower Extremity with slight lean. Continue to progress  stance time and stability in right hip during gait.     Camilla Is progressing well towards his goals.   Patient prognosis is Good.     Patient will continue to benefit from skilled outpatient physical therapy to address the deficits listed in the problem list box on initial evaluation, provide pt/family education and to maximize pt's level of independence in the home and community environment.     Patient's spiritual, cultural and educational needs considered and pt agreeable to plan of care and goals.     Anticipated barriers to physical therapy: none    Goals:        STG  Weeks/Visits Date Established  Goal Status    Patient will ambulate with good gait mechanics and weight shifting with LRAD to improve functional mobility  5 weeks/ 10 visits  4/19/2024    In progress 5/22/2024    2. Patient will increase R hip AROM flexion to >/= 100 deg and abduction >/= 40 deg to improve bed mobility  5 weeks/ 10 visits  4/19/2024     In progress 5/22/2024   3. Patient will perform sit <> stand with equal weight bearing to improve transfers  5 weeks/ 10 visits  4/19/2024     In progress 5/22/2024   4. Patient will perform SLR without quad lag to improve return to PLOF  5 weeks/ 10 visits  4/19/2024     In progress 5/22/2024   5.Pt will demonstrate and verbalize compliance and independence with HEP to improve therapy outcomes  5 weeks/ 10 visits  4/19/2024     In progress 5/22/2024   6. Patient will report </= 5/10 for at worst pain to improve quality of life  5 weeks/ 10 visits 4/19/2024     In progress 5/22/2024      LTG Weeks/Visits Date Established  Goal Status    1.Patient will increase R hip AROM flexion to >/= 110 deg and abduction >/= 50 deg to improve bed mobility  10 weeks/ 20 visits  4/19/2024        2. Patient will perform sit <> stand without UE support with equal weight bearing to improve transfers  10 weeks/ 20 visits  4/19/2024           3.Patient will perform stairs with David to improve his ability to return to  work  10 weeks/ 20 visits  4/19/2024        4.Patient will report </= 3/10 for at worst pain to improve quality of life  10 weeks/ 20 visits  4/19/2024        5.Patient will ambulate with independence with good weight shifting to improve community mobility 10 weeks/ 20 visits  4/19/2024             Plan     Progress as per patients tolerance. Continue to address range of motion and strength deficits.     Nemo Hughes, PTA

## 2024-05-24 ENCOUNTER — CLINICAL SUPPORT (OUTPATIENT)
Dept: REHABILITATION | Facility: HOSPITAL | Age: 44
End: 2024-05-24
Payer: COMMERCIAL

## 2024-05-24 DIAGNOSIS — M25.651 DECREASED RANGE OF RIGHT HIP MOVEMENT: ICD-10-CM

## 2024-05-24 DIAGNOSIS — R29.898 DECREASED STRENGTH OF LOWER EXTREMITY: Primary | ICD-10-CM

## 2024-05-24 DIAGNOSIS — Z74.09 IMPAIRED FUNCTIONAL MOBILITY, BALANCE, GAIT, AND ENDURANCE: ICD-10-CM

## 2024-05-24 PROCEDURE — 97110 THERAPEUTIC EXERCISES: CPT | Mod: PN

## 2024-05-24 PROCEDURE — 97140 MANUAL THERAPY 1/> REGIONS: CPT | Mod: PN

## 2024-05-24 PROCEDURE — 97112 NEUROMUSCULAR REEDUCATION: CPT | Mod: PN

## 2024-05-24 PROCEDURE — 97116 GAIT TRAINING THERAPY: CPT | Mod: PN

## 2024-05-24 PROCEDURE — 97530 THERAPEUTIC ACTIVITIES: CPT | Mod: PN

## 2024-05-24 NOTE — PROGRESS NOTES
[facility]   Physical Therapy Treatment Note      Name: Camilla [facility] Number: [id number]    Therapy Diagnosis:   Encounter Diagnoses   Name Primary?    Decreased strength of lower extremity Yes    Decreased range of right hip movement     Impaired functional mobility, balance, gait, and endurance        Physician: Alexi Beverly*    Visit Date: 5/24/2024    Physician Orders: PT Eval and Treat   Medical Diagnosis from Referral: Z98.890,Z87.81 (ICD-10-CM) - S/P ORIF (open reduction internal fixation) fracture   Evaluation Date: 4/19/2024  Authorization Period Expiration: 5/31/2024  Plan of Care Expiration: 8/19/2024  Progress Note Due: 5/19/2024  Date of Surgery: 3/5/2024  Visit # / Visits authorized: 7 / 12   FOTO: 1 / 3     Precautions: Standard and Weightbearing WBAT     Time In: 1230  Time Out: 1331   Total Billable Time: 61 minutes    PTA Visit #: 0 / 5     Subjective     Patient reports: has been doing good but seems like he has feeling metal more.     He was compliant with home exercise program.  Response to previous treatment: no issues stated   Functional change: increasing activity, using single crutch for long distance  Pain: 0/10  Location: right hip      Objective      Objective Measures updated at progress report unless specified.     AROM: 40 deg abduction, 100 deg flexion   Improvement in weight shift with sit <> stand     Treatment     Camilla received the treatments listed below:      therapeutic exercises to develop strength, endurance, ROM, and flexibility for 15 minutes including:    Upright bike x 5 minutes L3 start of tx session, seat height 9 for Hip range of motion and activation   Hip ER stretch in hooklying over LLE 4 x 15 second   Standing hip flexor stretch at chair 3 x 30 second   + Standing heel raises 3 x 10     manual therapy techniques: Joint mobilizations and STM were applied to the: right hip for 8 minutes, including:    Caudal glides grade  II  MMW into flexion   Long axis distraction grade III   Anterior mobs grade 2, MWM into extension   Lateral glides grade III    neuromuscular re-education activities to improve: Balance, Coordination, Kinesthetic, Sense, and Proprioception for 15 minutes. The following activities were included:    Prone press ups 1 x 15   Prone hip extension 3 x 8 repetitions   Seated ER 3 x 10   Bridging Green Theraband 20 repetitions    Standing right lower extremity hip abduction 20 repetitions bilateral     NP:  Seated hip Internal Rotation 2 x 10   Prone on elbows with therapist repeated knee extension 2 x 10 , decrease better   Sidelying ER clams 20 repetitions   Repeated standing extensions at counter 20 repetitions , decrease better   seated ER 2 x 10 repetitions  Standing RLE flexion with quad set 2 x 10  Standing right lower extremity hip extension 3 x 10 repetitions      therapeutic activities to improve functional performance for 15 minutes, including:    Forward step ups 20 repetitions    Lateral step ups 20 repetitions    Shuttle R LE only 25# x 10   Stand to sit to 30 inch box with foam 20 repetitions   Sit to stand from 30 inch step f20 repetitions     gait training to improve functional mobility and safety for 8 minutes, including:    +Fwd toe taps with LLE for SLS on RLE balance and activation of glut med for pelvic stability with ambulation  3 x 10   + Standing hip hiking for glut med activation for ambulation 2 x 10  Stair navigation 1 flight of stairs with use of bilateral handrails     Patient Education and Home Exercises       Education provided:   - Home Exercise Program  -Healing     Written Home Exercises Provided: yes. Exercises were reviewed and Camilla was able to demonstrate them prior to the end of the session.  Camilla demonstrated good  understanding of the education provided. See Electronic Medical Record under Patient Instructions for exercises provided during therapy sessions    Assessment      Patient arrives with minimal complaints. He does reports he seems to be feeling the metal more in his hip. He continues to have some anterior groin pain with certain movements but significant improvement over the past two weeks. He continues to demonstrate antalgic gait and R trunk lean with ambulation due to weakness of hip abductors. Added WB hip abductor strengthening with good tolerance and without pain. He demonstrates improvement in weight shifting ability with sit to stand and with ambulation. His range of motion of the hip demonstrates improvement and he is able to perform a SLR without quad lag which is a significant improvement. He is making great progress towards goals.     Camilla Is progressing well towards his goals.   Patient prognosis is Good.     Patient will continue to benefit from skilled outpatient physical therapy to address the deficits listed in the problem list box on initial evaluation, provide pt/family education and to maximize pt's level of independence in the home and community environment.     Patient's spiritual, cultural and educational needs considered and pt agreeable to plan of care and goals.     Anticipated barriers to physical therapy: none    Goals:        STG  Weeks/Visits Date Established  Goal Status    Patient will ambulate with good gait mechanics and weight shifting with LRAD to improve functional mobility  5 weeks/ 10 visits  4/19/2024    MET 5/24/2024   2. Patient will increase R hip AROM flexion to >/= 100 deg and abduction >/= 40 deg to improve bed mobility  5 weeks/ 10 visits  4/19/2024    MET 5/24/2024   3. Patient will perform sit <> stand with equal weight bearing to improve transfers  5 weeks/ 10 visits  4/19/2024    MET 5/24/2024   4. Patient will perform SLR without quad lag to improve return to PLOF  5 weeks/ 10 visits  4/19/2024    MET 5/24/2024   5.Pt will demonstrate and verbalize compliance and independence with HEP to improve therapy outcomes  5  weeks/ 10 visits  4/19/2024    MET 5/24/2024   6. Patient will report </= 5/10 for at worst pain to improve quality of life  5 weeks/ 10 visits 4/19/2024     In progress 5/24/2024      LTG Weeks/Visits Date Established  Goal Status    1.Patient will increase R hip AROM flexion to >/= 110 deg and abduction >/= 50 deg to improve bed mobility  10 weeks/ 20 visits  4/19/2024    Progressing 5/24/2024     2. Patient will perform sit <> stand without UE support with equal weight bearing to improve transfers  10 weeks/ 20 visits  4/19/2024       Progressing 5/24/2024      3.Patient will perform stairs with David to improve his ability to return to work  10 weeks/ 20 visits  4/19/2024    Progressing 5/24/2024       4.Patient will report </= 3/10 for at worst pain to improve quality of life  10 weeks/ 20 visits  4/19/2024    Progressing 5/24/2024     5.Patient will ambulate with independence with good weight shifting to improve community mobility 10 weeks/ 20 visits  4/19/2024    Progressing 5/24/2024          Plan     Progress as per patients tolerance. Continue to address range of motion and strength deficits.     Continue to strengthen hip abductors in WB positions.     Marie Zayas, PT

## 2024-05-28 NOTE — PROGRESS NOTES
[facility]   Physical Therapy Treatment Note      Name: Camilla [facility] Number: [id number]    Therapy Diagnosis:   No diagnosis found.      Physician: Alexi Beverly*    Visit Date: 5/29/2024    Physician Orders: PT Eval and Treat   Medical Diagnosis from Referral: Z98.890,Z87.81 (ICD-10-CM) - S/P ORIF (open reduction internal fixation) fracture   Evaluation Date: 4/19/2024  Authorization Period Expiration: 5/31/2024  Plan of Care Expiration: 8/19/2024  Progress Note Due: 5/19/2024  Date of Surgery: 3/5/2024  Visit # / Visits authorized:  / 12   FOTO: 1 / 3     Precautions: Standard and Weightbearing WBAT     Time In:   Time Out:   Total Billable Time:  minutes    PTA Visit #: 0 / 5     Subjective     Patient reports:     He was compliant with home exercise program.  Response to previous treatment: no issues stated   Functional change: increasing activity, using single crutch for long distance  Pain: 0/10  Location: right hip      Objective      Objective Measures updated at progress report unless specified.     AROM: 40 deg abduction, 100 deg flexion   Improvement in weight shift with sit <> stand     Treatment     Camilla received the treatments listed below:      therapeutic exercises to develop strength, endurance, ROM, and flexibility for 15 minutes including:    Upright bike x 5 minutes L3 start of tx session, seat height 9 for Hip range of motion and activation   Hip ER stretch in hooklying over LLE 4 x 15 second   Standing hip flexor stretch at chair 3 x 30 second   + Standing heel raises 3 x 10     manual therapy techniques: Joint mobilizations and STM were applied to the: right hip for 8 minutes, including:    Caudal glides grade II  MMW into flexion   Long axis distraction grade III   Anterior mobs grade 2, MWM into extension   Lateral glides grade III    neuromuscular re-education activities to improve: Balance, Coordination, Kinesthetic, Sense, and Proprioception  for 15 minutes. The following activities were included:    Prone press ups 1 x 15   Prone hip extension 3 x 8 repetitions   Seated ER 3 x 10   [place] Theraband 20 repetitions    Standing right lower extremity hip abduction 20 repetitions bilateral     NP:  Seated hip Internal Rotation 2 x 10   Prone on elbows with therapist repeated knee extension 2 x 10 , decrease better   Sidelying ER clams 20 repetitions   Repeated standing extensions at counter 20 repetitions , decrease better   seated ER 2 x 10 repetitions  Standing RLE flexion with quad set 2 x 10  Standing right lower extremity hip extension 3 x 10 repetitions      therapeutic activities to improve functional performance for 15 minutes, including:    Forward step ups 20 repetitions    Lateral step ups 20 repetitions    Shuttle R LE only 25# x 10   Stand to sit to 30 inch box with foam 20 repetitions   Sit to stand from 30 inch step f20 repetitions     gait training to improve functional mobility and safety for 8 minutes, including:    +Fwd toe taps with LLE for SLS on RLE balance and activation of glut med for pelvic stability with ambulation  3 x 10   + Standing hip hiking for glut med activation for ambulation 2 x 10  Stair navigation 1 flight of stairs with use of bilateral handrails     Patient Education and Home Exercises       Education provided:   - Home Exercise Program  -Healing     Written Home Exercises Provided: yes. Exercises were reviewed and Camilla was able to demonstrate them prior to the end of the session.  Camilla demonstrated good  understanding of the education provided. See Electronic Medical Record under Patient Instructions for exercises provided during therapy sessions    Assessment     Patient arrives with     minimal complaints. He does reports he seems to be feeling the metal more in his hip. He continues to have some anterior groin pain with certain movements but significant improvement over the past two weeks. He continues  to demonstrate antalgic gait and R trunk lean with ambulation due to weakness of hip abductors. Added WB hip abductor strengthening with good tolerance and without pain. He demonstrates improvement in weight shifting ability with sit to stand and with ambulation. His range of motion of the hip demonstrates improvement and he is able to perform a SLR without quad lag which is a significant improvement. He is making great progress towards goals.     Camilla Is progressing well towards his goals.   Patient prognosis is Good.     Patient will continue to benefit from skilled outpatient physical therapy to address the deficits listed in the problem list box on initial evaluation, provide pt/family education and to maximize pt's level of independence in the home and community environment.     Patient's spiritual, cultural and educational needs considered and pt agreeable to plan of care and goals.     Anticipated barriers to physical therapy: none    Goals:        STG  Weeks/Visits Date Established  Goal Status    Patient will ambulate with good gait mechanics and weight shifting with LRAD to improve functional mobility  5 weeks/ 10 visits  4/19/2024    MET 5/24/2024   2. Patient will increase R hip AROM flexion to >/= 100 deg and abduction >/= 40 deg to improve bed mobility  5 weeks/ 10 visits  4/19/2024    MET 5/24/2024   3. Patient will perform sit <> stand with equal weight bearing to improve transfers  5 weeks/ 10 visits  4/19/2024    MET 5/24/2024   4. Patient will perform SLR without quad lag to improve return to PLOF  5 weeks/ 10 visits  4/19/2024    MET 5/24/2024   5.Pt will demonstrate and verbalize compliance and independence with HEP to improve therapy outcomes  5 weeks/ 10 visits  4/19/2024    MET 5/24/2024   6. Patient will report </= 5/10 for at worst pain to improve quality of life  5 weeks/ 10 visits 4/19/2024     In progress 5/29/2024      LTG Weeks/Visits Date Established  Goal Status    1.Patient will  increase R hip AROM flexion to >/= 110 deg and abduction >/= 50 deg to improve bed mobility  10 weeks/ 20 visits  4/19/2024    Progressing 5/29/2024     2. Patient will perform sit <> stand without UE support with equal weight bearing to improve transfers  10 weeks/ 20 visits  4/19/2024       Progressing 5/29/2024      3.Patient will perform stairs with David to improve his ability to return to work  10 weeks/ 20 visits  4/19/2024    Progressing 5/29/2024       4.Patient will report </= 3/10 for at worst pain to improve quality of life  10 weeks/ 20 visits  4/19/2024    Progressing 5/29/2024     5.Patient will ambulate with independence with good weight shifting to improve community mobility 10 weeks/ 20 visits  4/19/2024    Progressing 5/29/2024          Plan     Progress as per patients tolerance. Continue to address range of motion and strength deficits.     Continue to strengthen hip abductors in WB positions.     Whitney Reese, PTA

## 2024-05-29 ENCOUNTER — CLINICAL SUPPORT (OUTPATIENT)
Dept: REHABILITATION | Facility: HOSPITAL | Age: 44
End: 2024-05-29
Payer: COMMERCIAL

## 2024-05-29 DIAGNOSIS — Z74.09 IMPAIRED FUNCTIONAL MOBILITY, BALANCE, GAIT, AND ENDURANCE: ICD-10-CM

## 2024-05-29 DIAGNOSIS — M25.651 DECREASED RANGE OF RIGHT HIP MOVEMENT: ICD-10-CM

## 2024-05-29 DIAGNOSIS — R29.898 DECREASED STRENGTH OF LOWER EXTREMITY: Primary | ICD-10-CM

## 2024-05-29 PROCEDURE — 97110 THERAPEUTIC EXERCISES: CPT | Mod: PN

## 2024-05-29 PROCEDURE — 97530 THERAPEUTIC ACTIVITIES: CPT | Mod: PN

## 2024-05-29 PROCEDURE — 97116 GAIT TRAINING THERAPY: CPT | Mod: PN

## 2024-05-29 NOTE — PROGRESS NOTES
[facility] OUTPATIENT THERAPY AND WELLNESS   Physical Therapy Treatment Note      Name: Camilla [facility] Number: [id number]    Therapy Diagnosis:   Encounter Diagnoses   Name Primary?    Decreased strength of lower extremity Yes    Decreased range of right hip movement     Impaired functional mobility, balance, gait, and endurance        Physician: Alexi Beverly*    Visit Date: 2024    Physician Orders: PT Eval and Treat   Medical Diagnosis from Referral: Z98.890,Z87.81 (ICD-10-CM) - S/P ORIF (open reduction internal fixation) fracture   Evaluation Date: 2024  Authorization Period Expiration: 2024  Plan of Care Expiration: 2024  Progress Note Due: 2024  Date of Surgery: 3/5/2024  Visit # / Visits authorized:    FOTO: 2 / 3     Precautions: Standard and Weightbearing WBAT     Time In: 1100   Time Out: 1200   Total Billable Time: 52 minutes ( 8 non billable)     PTA Visit #: 0 /      Subjective     Patient reports: his visit with surgeon went well. Demonstrates good healing. He is glad he has been doing WB activities. Reports at worst pain of 2/10.     He was compliant with home exercise program.  Response to previous treatment: no issues stated   Functional change: increasing activity, using single crutch for long distance  Pain: 0/10  Location: right hip      Objective      Objective Measures updated at progress report unless specified.     Ambulation: without AD with R trunk lean with WB through RLE due to weakness fo glut med, decreased push off, heel early off, decreased hip extension     Stairs: David with B UE support on Hrs, no pain     30 second chair rise test: 14 repetitions     TU without AD      Range of motion:    Flexion 110    Extension: NT   ER: 10 A, 30 P   IR: 30 A, 40 P    Abduction 30 A, 40 P     FOTO: 61/100 (1/100 initially)    Treatment     Camilla received the treatments listed below:      therapeutic exercises to develop strength, endurance, ROM,  and flexibility for 23 minutes including:    Upright bike x 8 minutes L5 start of tx session  Hip ER stretch in hooklying over LLE 5 x 15 second   Hip flexor stretch off edge of mat 5 x 30 second   Standing heel raises 3 x 10   Supine SLR 3 x 10   Prone press ups 2 x 10   Prone hip extension 3 x 8 repetitions   Seated ER 3 x 10     manual therapy techniques: Joint mobilizations and STM were applied to the: right hip for 4 minutes, including:    Caudal glides grade II  MMW into flexion     NP:  Long axis distraction grade III   Anterior mobs grade 2, MWM into extension   Lateral glides grade III    neuromuscular re-education activities to improve: Balance, Coordination, Kinesthetic, Sense, and Proprioception for 0 minutes. The following activities were included:    NP:  Seated hip Internal Rotation 2 x 10   Prone on elbows with therapist repeated knee extension 2 x 10 , decrease better   Sidelying ER clams 20 repetitions   Repeated standing extensions at counter 20 repetitions , decrease better   seated ER 2 x 10 repetitions  Standing RLE flexion with quad set 2 x 10  Standing right lower extremity hip extension 3 x 10 repetitions      therapeutic activities to improve functional performance for 17 minutes, including:    Testing above   Forward step ups 20 repetitions    Lateral step ups 20 repetitions  - NP  Shuttle R LE only 37# 3 x 10   Sit to stand from 30 inch step 20 repetitions     gait training to improve functional mobility and safety for 8 minutes, including:    Fwd toe taps with LLE for SLS on RLE balance and activation of glut med for pelvic stability with ambulation  2 x 10  + Lateral toe taps 2 x 10    Standing hip hiking for glut med activation for ambulation 2 x 10    NP:  Stair navigation 1 flight of stairs with use of bilateral handrails     Patient Education and Home Exercises       Education provided:   - Home Exercise Program  -Healing     Written Home Exercises Provided: yes. Exercises were  reviewed and Camilla was able to demonstrate them prior to the end of the session.  Camilla demonstrated good  understanding of the education provided. See Electronic Medical Record under Patient Instructions for exercises provided during therapy sessions    Assessment     Patient arrived with single axillary crutch but he is able to ambulate independently with gait deficits include a R lateral lean with WB through RLE due to hip abductor weakness. He tolerated tx session well. He had increased complaints of anterior hip and groin pain today compared to previous session. Improvement in groin pain following manual therapy and hip flexor stretching. He demonstrates significant improvement in functional mobility, gait speed, range of motion, and strength since evaluation based on testing today in which he has met all short and long term goals except one. He continues to demonstrate weakness of R hip based on active and passive range of motion measurements. Overall he is making great progress.     Camilla Is progressing well towards his goals.   Patient prognosis is Good.     Patient will continue to benefit from skilled outpatient physical therapy to address the deficits listed in the problem list box on initial evaluation, provide pt/family education and to maximize pt's level of independence in the home and community environment.     Patient's spiritual, cultural and educational needs considered and pt agreeable to plan of care and goals.     Anticipated barriers to physical therapy: none    Goals:        STG  Weeks/Visits Date Established  Goal Status    Patient will ambulate with good gait mechanics and weight shifting with LRAD to improve functional mobility  5 weeks/ 10 visits  4/19/2024    MET 5/24/2024   2. Patient will increase R hip AROM flexion to >/= 100 deg and abduction >/= 40 deg to improve bed mobility  5 weeks/ 10 visits  4/19/2024    MET 5/24/2024   3. Patient will perform sit <> stand with equal weight  bearing to improve transfers  5 weeks/ 10 visits  4/19/2024    MET 5/24/2024   4. Patient will perform SLR without quad lag to improve return to PLOF  5 weeks/ 10 visits  4/19/2024    MET 5/24/2024   5.Pt will demonstrate and verbalize compliance and independence with HEP to improve therapy outcomes  5 weeks/ 10 visits  4/19/2024    MET 5/24/2024   6. Patient will report </= 5/10 for at worst pain to improve quality of life  5 weeks/ 10 visits 4/19/2024    MET 5/29/2024      LTG Weeks/Visits Date Established  Goal Status    1.Patient will increase R hip AROM flexion to >/= 110 deg and abduction >/= 50 deg to improve bed mobility  10 weeks/ 20 visits  4/19/2024    MET 5/29/204     2. Patient will perform sit <> stand without UE support with equal weight bearing to improve transfers  10 weeks/ 20 visits  4/19/2024       MET 5/29/2024   3.Patient will perform stairs with David to improve his ability to return to work  10 weeks/ 20 visits  4/19/2024    MET 5/29/2024     4.Patient will report </= 3/10 for at worst pain to improve quality of life  10 weeks/ 20 visits  4/19/2024    MET 5/29/2024     5.Patient will ambulate with independence with good weight shifting to improve community mobility 10 weeks/ 20 visits  4/19/2024    Progressing 5/29/2024        Plan     Progress as per patients tolerance. Continue to address range of motion and strength deficits.     Continue to strengthen hip abductors in WB positions.     Marie Zayas, PT

## 2024-05-30 NOTE — PLAN OF CARE
[facility]  Physical Therapy Plan of Care Note     Name: Camilla [facility] Number: [id number]    Therapy Diagnosis:   Encounter Diagnoses   Name Primary?    Decreased strength of lower extremity Yes    Decreased range of right hip movement     Impaired functional mobility, balance, gait, and endurance      Physician: Alexi Beverly*    Visit Date: 2024    Physician Orders: PT Eval and Treat   Medical Diagnosis from Referral: Z98.890,Z87.81 (ICD-10-CM) - S/P ORIF (open reduction internal fixation) fracture   Evaluation Date: 2024  Authorization Period Expiration: 2024  Plan of Care Expiration: 2024  Progress Note Due: 2024  Date of Surgery: 3/5/2024  Visit # / Visits authorized:    FOTO: 2 / 3     Precautions: Standard  Functional Level Prior to Evaluation:  independent     SUBJECTIVE     Update:     Patient reports: his visit with surgeon went well. Demonstrates good healing. He is glad he has been doing WB activities. Reports at worst pain of 2/10.     OBJECTIVE     Update:     Ambulation: without AD with R trunk lean with WB through RLE due to weakness fo glut med, decreased push off, heel early off, decreased hip extension      Stairs: David with B UE support on Hrs, no pain      30 second chair rise test: 14 repetitions      TU without AD       Range of motion:               Flexion 110               Extension: NT              ER: 10 A, 30 P              IR: 30 A, 40 P               Abduction 30 A, 40 P      FOTO: 61/100 (1/100 initially)    ASSESSMENT     Update:     POC is being updated 2/2 patient meeting all short term goals and all but one long term goal. He demonstrates significant improvement in R hip range of motion and strength. He also demonstrates significant improvement in functional mobility and ambulation abilities in which he is able to ambulate safely and independently without AD but continues to demonstrate R trunk lean  due to abductor weakness and decreased R stance time. He continues to ambulate with axillary crutch to decrease R trunk lean. He is able to navigate stair independently with bilateral handrails. He continues to demonstrate R weakness and range of motion deficits and impaired balance impeding his functional mobility and ability to return to PLOF as well as performing his job duties. He will continue to benefit from skilled Physical Therapist services to address above deficits to improve return to PLOF and job duties.     Previous Short Term Goals Status:   5/6 met   New Short Term Goals Status:   6/6 met   Long Term Goal Status: modified:  goals updated to reflect improvement as most goals have been met at this point   Reasons for Recertification of Therapy:   goal updated     GOALS    STG  Weeks/Visits Date Established  Goal Status    Patient will ambulate with good gait mechanics and weight shifting with LRAD to improve functional mobility  5 weeks/ 10 visits  4/19/2024    MET 5/24/2024   2. Patient will increase R hip AROM flexion to >/= 100 deg and abduction >/= 40 deg to improve bed mobility  5 weeks/ 10 visits  4/19/2024    MET 5/24/2024   3. Patient will perform sit <> stand with equal weight bearing to improve transfers  5 weeks/ 10 visits  4/19/2024    MET 5/24/2024   4. Patient will perform SLR without quad lag to improve return to PLOF  5 weeks/ 10 visits  4/19/2024    MET 5/24/2024   5.Pt will demonstrate and verbalize compliance and independence with HEP to improve therapy outcomes  5 weeks/ 10 visits  4/19/2024    MET 5/24/2024   6. Patient will report </= 5/10 for at worst pain to improve quality of life  5 weeks/ 10 visits 4/19/2024    MET 5/29/2024      LTG Weeks/Visits Date Established  Goal Status    1.Patient will increase R hip AROM flexion to >/= 110 deg and abduction >/= 50 deg to improve bed mobility  10 weeks/ 20 visits  4/19/2024    MET 5/29/204      2. Patient will perform sit <> stand  without UE support with equal weight bearing to improve transfers  10 weeks/ 20 visits  4/19/2024       MET 5/29/2024   3.Patient will perform stairs with David to improve his ability to return to work  10 weeks/ 20 visits  4/19/2024    MET 5/29/2024      4.Patient will report </= 3/10 for at worst pain to improve quality of life  10 weeks/ 20 visits  4/19/2024    MET 5/29/2024      5.Patient will ambulate with independence with good weight shifting to improve community mobility 10 weeks/ 20 visits  4/19/2024    Progressing 5/29/2024          New goals 5/29/2024    Patient will report >/= 75/100 on FOTO to improve return to PLOF   Patient will perform 16 repetitions on 30 second chair rise test to demonstrate improvement in functional LE strength and improve transfer abilities   Patient will navigate stairs with unilateral HR without gait deficits   Patient will ambulate with independence with good weight shifting to improve community mobility  Patient will increase R hip ER AROM to >/= 25 deg and IR AROM to >/= 40 deg to improve functional mobility   Patient will demonstrate independence and safety with navigate curbs and stepping over hurdles to improve community mobility     PLAN     Updated Certification Period: 5/29/2024 to 8/30/2023   Recommended Treatment Plan: 2 times per week for 4 weeks then 1 x/wk for 4 weeks total of 12 visits  :  Gait Training, Manual Therapy, Moist Heat/ Ice, Neuromuscular Re-ed, Patient Education, Therapeutic Activities, and Therapeutic Exercise  Other Recommendations: none     Marie Zayas, PT

## 2024-05-31 ENCOUNTER — CLINICAL SUPPORT (OUTPATIENT)
Dept: REHABILITATION | Facility: HOSPITAL | Age: 44
End: 2024-05-31
Payer: COMMERCIAL

## 2024-05-31 DIAGNOSIS — Z74.09 IMPAIRED FUNCTIONAL MOBILITY, BALANCE, GAIT, AND ENDURANCE: ICD-10-CM

## 2024-05-31 DIAGNOSIS — M25.651 DECREASED RANGE OF RIGHT HIP MOVEMENT: ICD-10-CM

## 2024-05-31 DIAGNOSIS — R29.898 DECREASED STRENGTH OF LOWER EXTREMITY: Primary | ICD-10-CM

## 2024-05-31 PROCEDURE — 97530 THERAPEUTIC ACTIVITIES: CPT | Mod: PN,CQ

## 2024-05-31 PROCEDURE — 97110 THERAPEUTIC EXERCISES: CPT | Mod: PN,CQ

## 2024-05-31 PROCEDURE — 97116 GAIT TRAINING THERAPY: CPT | Mod: PN,CQ

## 2024-05-31 NOTE — PROGRESS NOTES
[facility]   Physical Therapy Treatment Note      Name: Camilla [facility] Number: [id number]    Therapy Diagnosis:   Encounter Diagnoses   Name Primary?    Decreased strength of lower extremity Yes    Decreased range of right hip movement     Impaired functional mobility, balance, gait, and endurance        Physician: Alexi Beverly*    Visit Date: 5/31/2024    Physician Orders: PT Eval and Treat   Medical Diagnosis from Referral: Z98.890,Z87.81 (ICD-10-CM) - S/P ORIF (open reduction internal fixation) fracture   Evaluation Date: 4/19/2024  Authorization Period Expiration: 5/31/2024  Plan of Care Expiration: 8/19/2024  Progress Note Due: 5/19/2024  Date of Surgery: 3/5/2024  Visit # / Visits authorized: 10 / 12   FOTO: 2 / 3     Precautions: Standard and Weightbearing WBAT     Time In: 1230  Time Out: 125  Total Billable Time: 55 minutes     PTA Visit #: 1 / 5     Subjective     Patient reports: returns to work July 8th, hip is doing well.     He was compliant with home exercise program.  Response to previous treatment: no issues stated   Functional change: increasing activity, using single crutch for long distance  Pain: 0/10  Location: right hip      Objective      Objective Measures updated at progress report unless specified.       Treatment     Camilla received the treatments listed below:      therapeutic exercises to develop strength, endurance, ROM, and flexibility for 23 minutes including:    Upright bike x 8 minutes L5 start of tx session  Hip flexor stretch off edge of mat 5 x 30 second   Standing heel raises 3 x 10   Supine SLR 3 x 10   Bridges Green Theraband 3 x 10   Side lying clams Green Theraband 3 x 10   Side lying reverse clams 3 x 10   Prone press ups 2 x 10   Prone hip extension 3 x 8 repetitions       manual therapy techniques: Joint mobilizations and STM were applied to the: right hip for 0 minutes, including:    Caudal glides grade II  MMW into flexion      NP:  Long axis distraction grade III   Anterior mobs grade 2, MWM into extension   Lateral glides grade III    neuromuscular re-education activities to improve: Balance, Coordination, Kinesthetic, Sense, and Proprioception for 0 minutes. The following activities were included:    NP:  Seated hip Internal Rotation 2 x 10   Prone on elbows with therapist repeated knee extension 2 x 10 , decrease better   Sidelying ER clams 20 repetitions   Repeated standing extensions at counter 20 repetitions , decrease better   seated ER 2 x 10 repetitions  Standing RLE flexion with quad set 2 x 10  Standing right lower extremity hip extension 3 x 10 repetitions      therapeutic activities to improve functional performance for 17 minutes, including:    Forward step ups 20 repetitions    Lateral step ups 20 repetitions  - NP  Shuttle R LE only 37# 3 x 10   Sit to stand from 30 inch box x 20 repetitions   Sled push/pull 20# 10 yards x 3 laps     gait training to improve functional mobility and safety for 8 minutes, including:    Fwd toe taps with LLE for SLS on RLE balance and activation of glut med for pelvic stability with ambulation  2 x 10  Lateral toe taps 2 x 10    Standing hip hiking for glut med activation for ambulation 2 x 10 Bilateral     NP:  Stair navigation 1 flight of stairs with use of bilateral handrails     Patient Education and Home Exercises       Education provided:   - Home Exercise Program  -Healing     Written Home Exercises Provided: yes. Exercises were reviewed and Camilla was able to demonstrate them prior to the end of the session.  Camilla demonstrated good  understanding of the education provided. See Electronic Medical Record under Patient Instructions for exercises provided during therapy sessions    Assessment     Patient is progressing very well with strength and stability in right hip with great effort towards exercises with motivation to get back to work in July. Encouraged to focus on left leg  stride length during gait to increase weight shift and stance time on Right Lower Extremity to build up strength.     Camilla Is progressing well towards his goals.   Patient prognosis is Good.     Patient will continue to benefit from skilled outpatient physical therapy to address the deficits listed in the problem list box on initial evaluation, provide pt/family education and to maximize pt's level of independence in the home and community environment.     Patient's spiritual, cultural and educational needs considered and pt agreeable to plan of care and goals.     Anticipated barriers to physical therapy: none    Goals:        STG  Weeks/Visits Date Established  Goal Status    Patient will ambulate with good gait mechanics and weight shifting with LRAD to improve functional mobility  5 weeks/ 10 visits  4/19/2024    MET 5/24/2024   2. Patient will increase R hip AROM flexion to >/= 100 deg and abduction >/= 40 deg to improve bed mobility  5 weeks/ 10 visits  4/19/2024    MET 5/24/2024   3. Patient will perform sit <> stand with equal weight bearing to improve transfers  5 weeks/ 10 visits  4/19/2024    MET 5/24/2024   4. Patient will perform SLR without quad lag to improve return to PLOF  5 weeks/ 10 visits  4/19/2024    MET 5/24/2024   5.Pt will demonstrate and verbalize compliance and independence with HEP to improve therapy outcomes  5 weeks/ 10 visits  4/19/2024    MET 5/24/2024   6. Patient will report </= 5/10 for at worst pain to improve quality of life  5 weeks/ 10 visits 4/19/2024    MET 5/29/2024      LTG Weeks/Visits Date Established  Goal Status    1.Patient will increase R hip AROM flexion to >/= 110 deg and abduction >/= 50 deg to improve bed mobility  10 weeks/ 20 visits  4/19/2024    MET 5/29/204     2. Patient will perform sit <> stand without UE support with equal weight bearing to improve transfers  10 weeks/ 20 visits  4/19/2024       MET 5/29/2024   3.Patient will perform stairs with David to  improve his ability to return to work  10 weeks/ 20 visits  4/19/2024    MET 5/29/2024     4.Patient will report </= 3/10 for at worst pain to improve quality of life  10 weeks/ 20 visits  4/19/2024    MET 5/29/2024     5.Patient will ambulate with independence with good weight shifting to improve community mobility 10 weeks/ 20 visits  4/19/2024    Progressing 5/31/2024        Plan     Progress as per patients tolerance. Continue to address range of motion and strength deficits.     Continue to strengthen hip abductors in WB positions.     Nemo Hughes, PTA

## 2024-06-12 ENCOUNTER — CLINICAL SUPPORT (OUTPATIENT)
Dept: REHABILITATION | Facility: HOSPITAL | Age: 44
End: 2024-06-12
Payer: COMMERCIAL

## 2024-06-12 DIAGNOSIS — Z74.09 IMPAIRED FUNCTIONAL MOBILITY, BALANCE, GAIT, AND ENDURANCE: ICD-10-CM

## 2024-06-12 DIAGNOSIS — M25.651 DECREASED RANGE OF RIGHT HIP MOVEMENT: ICD-10-CM

## 2024-06-12 DIAGNOSIS — R29.898 DECREASED STRENGTH OF LOWER EXTREMITY: Primary | ICD-10-CM

## 2024-06-12 PROCEDURE — 97110 THERAPEUTIC EXERCISES: CPT | Mod: PN

## 2024-06-12 PROCEDURE — 97112 NEUROMUSCULAR REEDUCATION: CPT | Mod: PN

## 2024-06-12 PROCEDURE — 97530 THERAPEUTIC ACTIVITIES: CPT | Mod: PN

## 2024-06-12 NOTE — PROGRESS NOTES
[facility]   Physical Therapy Treatment Note      Name: Camilla [facility] Number: [id number]    Therapy Diagnosis:   Encounter Diagnoses   Name Primary?    Decreased strength of lower extremity Yes    Decreased range of right hip movement     Impaired functional mobility, balance, gait, and endurance        Physician: Alexi Beverly*    Visit Date: 6/12/2024    Physician Orders: PT Eval and Treat   Medical Diagnosis from Referral: Z98.890,Z87.81 (ICD-10-CM) - S/P ORIF (open reduction internal fixation) fracture   Evaluation Date: 4/19/2024  Authorization Period Expiration: 12/31/2024  Plan of Care Expiration: 8/19/2024  Progress Note Due: 5/19/2024  Date of Surgery: 3/5/2024  Visit # / Visits authorized: 11 / 20  FOTO: 2 / 3     Precautions: Standard and Weightbearing WBAT     Time In: 1103  Time Out: 1200   Total Billable Time: 57 minutes     PTA Visit #: 0 / 5     Subjective     Patient reports: returns to work July 8th. Had missed his last visits due to passing of his father. He has not been as compliant with HEP due to this. No pain upon arrival.     He was compliant with home exercise program.  Response to previous treatment: no issues stated   Functional change: increasing activity, using single crutch for long distance  Pain: 0/10  Location: right hip      Objective      Objective Measures updated at progress report unless specified.     Treatment     Camilla received the treatments listed below:      therapeutic exercises to develop strength, endurance, ROM, and flexibility for 38 minutes including:    Upright bike x 8 minutes L5 start of tx session  Hip flexor stretch off edge of mat 5 x 30 second   Standing heel raises 3 x 10   Supine SLR 3 x 10   Bridges Green Theraband 3 x 10   Side lying clams Green Theraband 3 x 10   Side lying reverse clams 3 x 10   Prone press ups 2 x 10   Prone hip extension 3 x 8 repetitions     manual therapy techniques: Joint mobilizations  and STM were applied to the: right hip for 0 minutes, including:    Caudal glides grade II  MMW into flexion     NP:  Long axis distraction grade III   Anterior mobs grade 2, MWM into extension   Lateral glides grade III    neuromuscular re-education activities to improve: Balance, Coordination, Kinesthetic, Sense, and Proprioception for 8 minutes. The following activities were included:    Single leg balance cone taps 1 x 10 clockwise and then counter clockwise 1 x 10 CGA   Lateral stepping green theraband 3 x 10 ft each way   Hip hiking 3 x 8 repetitions for glut med activation    NP:  Seated hip Internal Rotation 2 x 10   Prone on elbows with therapist repeated knee extension 2 x 10 , decrease better   Sidelying ER clams 20 repetitions   Repeated standing extensions at counter 20 repetitions , decrease better   seated ER 2 x 10 repetitions  Standing RLE flexion with quad set 2 x 10  Standing right lower extremity hip extension 3 x 10 repetitions      therapeutic activities to improve functional performance for 11  minutes, including:    Forward step ups 2 x 10 without UE support   Lateral step ups 2 x 10 without UE support   Shuttle R LE only 37# 3 x 10   Stand to sit blue weight ball 5 lbs 20 repetitions     Sled push/pull 20# 10 yards x 3 laps - NP    gait training to improve functional mobility and safety for 0 minutes, including:  NP:  Stair navigation 1 flight of stairs with use of bilateral handrails     Patient Education and Home Exercises       Education provided:   - Home Exercise Program  -Healing     Written Home Exercises Provided: yes. Exercises were reviewed and Vikiron was able to demonstrate them prior to the end of the session.  Keyron demonstrated good  understanding of the education provided. See Electronic Medical Record under Patient Instructions for exercises provided during therapy sessions    Assessment     Patient continues to arrive with axillary crutch with continued improvement in R hip  pain range of motion and strength. He tolerated tx session well without complaints of pain throughout. Progressed SLS balance to cone toe taps with CGA with occasional LOB with self recovery. He continues to demonstrate severe glut med weakness impairing his gait mechanics and difficulty with hip hiking. He continues to demonstrate decreased posterior glide of R hip as evident during squatting positions. Overall he continues to progress very well.     Camilla Is progressing well towards his goals.   Patient prognosis is Good.     Patient will continue to benefit from skilled outpatient physical therapy to address the deficits listed in the problem list box on initial evaluation, provide pt/family education and to maximize pt's level of independence in the home and community environment.     Patient's spiritual, cultural and educational needs considered and pt agreeable to plan of care and goals.     Anticipated barriers to physical therapy: none    Goals:        STG  Weeks/Visits Date Established  Goal Status    Patient will ambulate with good gait mechanics and weight shifting with LRAD to improve functional mobility  5 weeks/ 10 visits  4/19/2024    MET 5/24/2024   2. Patient will increase R hip AROM flexion to >/= 100 deg and abduction >/= 40 deg to improve bed mobility  5 weeks/ 10 visits  4/19/2024    MET 5/24/2024   3. Patient will perform sit <> stand with equal weight bearing to improve transfers  5 weeks/ 10 visits  4/19/2024    MET 5/24/2024   4. Patient will perform SLR without quad lag to improve return to PLOF  5 weeks/ 10 visits  4/19/2024    MET 5/24/2024   5.Pt will demonstrate and verbalize compliance and independence with HEP to improve therapy outcomes  5 weeks/ 10 visits  4/19/2024    MET 5/24/2024   6. Patient will report </= 5/10 for at worst pain to improve quality of life  5 weeks/ 10 visits 4/19/2024    MET 5/29/2024      LTG Weeks/Visits Date Established  Goal Status    1.Patient will  increase R hip AROM flexion to >/= 110 deg and abduction >/= 50 deg to improve bed mobility  10 weeks/ 20 visits  4/19/2024    MET 5/29/204     2. Patient will perform sit <> stand without UE support with equal weight bearing to improve transfers  10 weeks/ 20 visits  4/19/2024       MET 5/29/2024   3.Patient will perform stairs with David to improve his ability to return to work  10 weeks/ 20 visits  4/19/2024    MET 5/29/2024     4.Patient will report </= 3/10 for at worst pain to improve quality of life  10 weeks/ 20 visits  4/19/2024    MET 5/29/2024     5.Patient will ambulate with independence with good weight shifting to improve community mobility 10 weeks/ 20 visits  4/19/2024    Progressing 6/12/2024        Plan     Progress as per patients tolerance. Continue to address range of motion and strength deficits.     Continue to strengthen hip abductors in WB positions.     Marie Zayas, PT

## 2024-06-19 ENCOUNTER — CLINICAL SUPPORT (OUTPATIENT)
Dept: REHABILITATION | Facility: HOSPITAL | Age: 44
End: 2024-06-19
Payer: COMMERCIAL

## 2024-06-19 DIAGNOSIS — R29.898 DECREASED STRENGTH OF LOWER EXTREMITY: Primary | ICD-10-CM

## 2024-06-19 DIAGNOSIS — M25.651 DECREASED RANGE OF RIGHT HIP MOVEMENT: ICD-10-CM

## 2024-06-19 DIAGNOSIS — Z74.09 IMPAIRED FUNCTIONAL MOBILITY, BALANCE, GAIT, AND ENDURANCE: ICD-10-CM

## 2024-06-19 PROCEDURE — 97112 NEUROMUSCULAR REEDUCATION: CPT | Mod: PN,CQ

## 2024-06-19 PROCEDURE — 97110 THERAPEUTIC EXERCISES: CPT | Mod: PN,CQ

## 2024-06-19 PROCEDURE — 97530 THERAPEUTIC ACTIVITIES: CPT | Mod: PN,CQ

## 2024-06-19 NOTE — PROGRESS NOTES
[facility]   Physical Therapy Treatment Note      Name: Camilla [facility] Number: [id number]    Therapy Diagnosis:   Encounter Diagnoses   Name Primary?    Decreased strength of lower extremity Yes    Decreased range of right hip movement     Impaired functional mobility, balance, gait, and endurance      Physician: Alexi Beverly*    Visit Date: 6/21/2024    Physician Orders: PT Eval and Treat   Medical Diagnosis from Referral: Z98.890,Z87.81 (ICD-10-CM) - S/P ORIF (open reduction internal fixation) fracture   Evaluation Date: 4/19/2024  Authorization Period Expiration: 12/31/2024  Plan of Care Expiration: 8/19/2024  Progress Note Due: 5/19/2024  Date of Surgery: 3/5/2024  Visit # / Visits authorized: 14 / 20  FOTO: 2 / 3     Precautions: Standard and Weightbearing WBAT     Time In: 1230  Time Out: 1325  Total Billable Time: 55 minutes     PTA Visit #: 2 / 5     Subjective     Patient reports:     He was compliant with home exercise program.  Response to previous treatment: no issues stated   Functional change: increasing activity, using single crutch for long distance  Pain: 0/10  Location: right hip      Objective      Objective Measures updated at progress report unless specified.     Treatment     Camilla received the treatments listed below:      therapeutic exercises to develop strength, endurance, ROM, and flexibility for 30 minutes including:    Upright bike x 8 minutes L5 start of tx session    Hip flexor stretch off edge of mat 5 x 30 second   Standing heel raises 3 x 10 reps   Supine Straight leg raise 3 x 10 reps    Bridges Green Theraband 3 x 10 reps    Side lying clams Green Theraband 3 x 10 reps    Side lying reverse clams 3 x 10 reps   Prone press ups 2 x 10 reps   Prone hip extension 3 x 8 repetitions       manual therapy techniques: Joint mobilizations and STM were applied to the: right hip for 0 minutes, including:    Caudal glides grade II  MMW into  flexion     NP:  Long axis distraction grade III   Anterior mobs grade 2, MWM into extension   Lateral glides grade III    neuromuscular re-education activities to improve: Balance, Coordination, Kinesthetic, Sense, and Proprioception for 8 minutes. The following activities were included:    Single leg balance cone taps 1 x 10 clockwise and then counter clockwise 1 x 10 reps Contact Guard Assist    Lateral stepping green theraband 3 x 10 ft each way   Hip hiking 3 x 8 repetitions for glut med activation    Not Performed:  Seated hip Internal Rotation 2 x 10 reps    Prone on elbows with therapist repeated knee extension 2 x 10 , decrease better   Sidelying external rotation  clams 20 repetitions   Repeated standing extensions at counter 20 repetitions , decrease better   seated external rotation  2 x 10 repetitions  Standing Right lower extremity  flexion with quad set 2 x 10  Standing right lower extremity hip extension 3 x 10 repetitions      therapeutic activities to improve functional performance for 15 minutes, including:    Forward step ups 2 x 10 reps without upper extremity support   Lateral step ups 2 x 10 reps without upper extremity support     Shuttle Right  lower extremity only 37# 3 x 10 reps   Stand to sit blue weight ball 5 lbs x 20 repetitions     Sled push/pull 20# 10 yards x 3 laps  Stair navigation 1 flight of stairs x 10 reps with use of bilateral handrails     Soft Tissue Mobs with therabar to Right hip groin x 2 minutes      gait training to improve functional mobility and safety for 0 minutes, including:  NP:    Patient Education and Home Exercises       Education provided:   - Home Exercise Program  -Healing   -+educated on scar massage/mobs to help with adhesions and desensitize area    Written Home Exercises Provided: yes. Exercises were reviewed and Camilla was able to demonstrate them prior to the end of the session.  Camilla demonstrated good  understanding of the education provided. See  Electronic Medical Record under Patient Instructions for exercises provided during therapy sessions    Assessment     Patient arrived to PT reporting pain in his Right groin.  Pt using standard straight cane for ambulation.  He did well with exercises overall but had some sharp pain when he was rolling ti his Right side in his hip.  No pain when rolling to Left side.      Camilla Is progressing well towards his goals.   Patient prognosis is Good.     Patient will continue to benefit from skilled outpatient physical therapy to address the deficits listed in the problem list box on initial evaluation, provide pt/family education and to maximize pt's level of independence in the home and community environment.     Patient's spiritual, cultural and educational needs considered and pt agreeable to plan of care and goals.     Anticipated barriers to physical therapy: none    Goals:        STG  Weeks/Visits Date Established  Goal Status    Patient will ambulate with good gait mechanics and weight shifting with LRAD to improve functional mobility  5 weeks/ 10 visits  4/19/2024    MET 5/24/2024   2. Patient will increase R hip AROM flexion to >/= 100 deg and abduction >/= 40 deg to improve bed mobility  5 weeks/ 10 visits  4/19/2024    MET 5/24/2024   3. Patient will perform sit <> stand with equal weight bearing to improve transfers  5 weeks/ 10 visits  4/19/2024    MET 5/24/2024   4. Patient will perform SLR without quad lag to improve return to PLOF  5 weeks/ 10 visits  4/19/2024    MET 5/24/2024   5.Pt will demonstrate and verbalize compliance and independence with HEP to improve therapy outcomes  5 weeks/ 10 visits  4/19/2024    MET 5/24/2024   6. Patient will report </= 5/10 for at worst pain to improve quality of life  5 weeks/ 10 visits 4/19/2024    MET 5/29/2024      LTG Weeks/Visits Date Established  Goal Status    1.Patient will increase R hip AROM flexion to >/= 110 deg and abduction >/= 50 deg to improve bed  mobility  10 weeks/ 20 visits  4/19/2024    MET 5/29/204     2. Patient will perform sit <> stand without UE support with equal weight bearing to improve transfers  10 weeks/ 20 visits  4/19/2024       MET 5/29/2024   3.Patient will perform stairs with David to improve his ability to return to work  10 weeks/ 20 visits  4/19/2024    MET 5/29/2024     4.Patient will report </= 3/10 for at worst pain to improve quality of life  10 weeks/ 20 visits  4/19/2024    MET 5/29/2024     5.Patient will ambulate with independence with good weight shifting to improve community mobility 10 weeks/ 20 visits  4/19/2024    Progressing 6/21/2024        Plan     Progress as per patients tolerance. Continue to address range of motion and strength deficits.     Continue to strengthen hip abductors in WB positions.     Whitney Reese, PTA

## 2024-06-19 NOTE — PROGRESS NOTES
[facility]   Physical Therapy Treatment Note      Name: [facility] Number: [id number]    Therapy Diagnosis:   Encounter Diagnoses   Name Primary?    Decreased strength of lower extremity Yes    Decreased range of right hip movement     Impaired functional mobility, balance, gait, and endurance        Physician: Alexi Beverly*    Visit Date: 6/19/2024    Physician Orders: PT Eval and Treat   Medical Diagnosis from Referral: Z98.890,Z87.81 (ICD-10-CM) - S/P ORIF (open reduction internal fixation) fracture   Evaluation Date: 4/19/2024  Authorization Period Expiration: 12/31/2024  Plan of Care Expiration: 8/19/2024  Progress Note Due: 5/19/2024  Date of Surgery: 3/5/2024  Visit # / Visits authorized: 12 / 20  FOTO: 2 / 3     Precautions: Standard and Weightbearing WBAT     Time In: 1103  Time Out: 1200   Total Billable Time: 57 minutes     PTA Visit #: 0 / 5     Subjective     Patient reports: where the rods are is where he feels his discomfort. Arrived with a cane today.     He was compliant with home exercise program.  Response to previous treatment: no issues stated   Functional change: increasing activity, using single crutch for long distance  Pain: 0/10  Location: right hip      Objective      Objective Measures updated at progress report unless specified.     Treatment     Camilla received the treatments listed below:      therapeutic exercises to develop strength, endurance, ROM, and flexibility for 38 minutes including:    Upright bike x 8 minutes L5 start of tx session  Hip flexor stretch off edge of mat 5 x 30 second   Standing heel raises 3 x 10   Supine SLR 3 x 10   Bridges Green Theraband 3 x 10   Side lying clams Green Theraband 3 x 10   Side lying reverse clams 3 x 10   Prone press ups 2 x 10   Prone hip extension 3 x 8 repetitions     manual therapy techniques: Joint mobilizations and STM were applied to the: right hip for 0 minutes, including:    Caudal  glides grade II  MMW into flexion     NP:  Long axis distraction grade III   Anterior mobs grade 2, MWM into extension   Lateral glides grade III    neuromuscular re-education activities to improve: Balance, Coordination, Kinesthetic, Sense, and Proprioception for 8 minutes. The following activities were included:    Single leg balance cone taps 1 x 10 clockwise and then counter clockwise 1 x 10 CGA   Lateral stepping green theraband 3 x 10 ft each way   Hip hiking 3 x 8 repetitions for glut med activation    therapeutic activities to improve functional performance for 11 minutes, including:    Forward step ups 2 x 10 without UE support   Lateral step ups 2 x 10 without UE support   Shuttle 100# 3 x 10   Shuttle R LE only 37# 3 x 10   Stand to sit blue weight ball 5 lbs 20 repetitions   Precor knee extension 50# 3 x 8    Sled push/pull 20# 10 yards x 3 laps         Patient Education and Home Exercises       Education provided:   - Home Exercise Program  -Healing     Written Home Exercises Provided: yes. Exercises were reviewed and Camilla was able to demonstrate them prior to the end of the session.  Camilla demonstrated good  understanding of the education provided. See Electronic Medical Record under Patient Instructions for exercises provided during therapy sessions    Assessment     Patient progressing very well, limited due to lawrence placement but strength and overall tolerance has greatly improved. Functional strength activities performed to help return patient to work. Continue to progress as able.     Camilla Is progressing well towards his goals.   Patient prognosis is Good.     Patient will continue to benefit from skilled outpatient physical therapy to address the deficits listed in the problem list box on initial evaluation, provide pt/family education and to maximize pt's level of independence in the home and community environment.     Patient's spiritual, cultural and educational needs considered and pt  agreeable to plan of care and goals.     Anticipated barriers to physical therapy: none    Goals:        STG  Weeks/Visits Date Established  Goal Status    Patient will ambulate with good gait mechanics and weight shifting with LRAD to improve functional mobility  5 weeks/ 10 visits  4/19/2024    MET 5/24/2024   2. Patient will increase R hip AROM flexion to >/= 100 deg and abduction >/= 40 deg to improve bed mobility  5 weeks/ 10 visits  4/19/2024    MET 5/24/2024   3. Patient will perform sit <> stand with equal weight bearing to improve transfers  5 weeks/ 10 visits  4/19/2024    MET 5/24/2024   4. Patient will perform SLR without quad lag to improve return to PLOF  5 weeks/ 10 visits  4/19/2024    MET 5/24/2024   5.Pt will demonstrate and verbalize compliance and independence with HEP to improve therapy outcomes  5 weeks/ 10 visits  4/19/2024    MET 5/24/2024   6. Patient will report </= 5/10 for at worst pain to improve quality of life  5 weeks/ 10 visits 4/19/2024    MET 5/29/2024      LTG Weeks/Visits Date Established  Goal Status    1.Patient will increase R hip AROM flexion to >/= 110 deg and abduction >/= 50 deg to improve bed mobility  10 weeks/ 20 visits  4/19/2024    MET 5/29/204     2. Patient will perform sit <> stand without UE support with equal weight bearing to improve transfers  10 weeks/ 20 visits  4/19/2024       MET 5/29/2024   3.Patient will perform stairs with David to improve his ability to return to work  10 weeks/ 20 visits  4/19/2024    MET 5/29/2024     4.Patient will report </= 3/10 for at worst pain to improve quality of life  10 weeks/ 20 visits  4/19/2024    MET 5/29/2024     5.Patient will ambulate with independence with good weight shifting to improve community mobility 10 weeks/ 20 visits  4/19/2024    Progressing 6/19/2024        Plan     Progress as per patients tolerance. Continue to address range of motion and strength deficits.     Continue to strengthen hip abductors in WB  positions.     Nemo Hughes, PTA

## 2024-06-21 ENCOUNTER — CLINICAL SUPPORT (OUTPATIENT)
Dept: REHABILITATION | Facility: HOSPITAL | Age: 44
End: 2024-06-21
Payer: COMMERCIAL

## 2024-06-21 DIAGNOSIS — Z74.09 IMPAIRED FUNCTIONAL MOBILITY, BALANCE, GAIT, AND ENDURANCE: ICD-10-CM

## 2024-06-21 DIAGNOSIS — R29.898 DECREASED STRENGTH OF LOWER EXTREMITY: Primary | ICD-10-CM

## 2024-06-21 DIAGNOSIS — M25.651 DECREASED RANGE OF RIGHT HIP MOVEMENT: ICD-10-CM

## 2024-06-21 PROCEDURE — 97112 NEUROMUSCULAR REEDUCATION: CPT | Mod: PN,CQ

## 2024-06-21 PROCEDURE — 97530 THERAPEUTIC ACTIVITIES: CPT | Mod: PN,CQ

## 2024-06-21 PROCEDURE — 97110 THERAPEUTIC EXERCISES: CPT | Mod: PN,CQ

## 2024-06-26 ENCOUNTER — CLINICAL SUPPORT (OUTPATIENT)
Dept: REHABILITATION | Facility: HOSPITAL | Age: 44
End: 2024-06-26
Payer: COMMERCIAL

## 2024-06-26 DIAGNOSIS — R29.898 DECREASED STRENGTH OF LOWER EXTREMITY: Primary | ICD-10-CM

## 2024-06-26 DIAGNOSIS — M25.651 DECREASED RANGE OF RIGHT HIP MOVEMENT: ICD-10-CM

## 2024-06-26 DIAGNOSIS — Z74.09 IMPAIRED FUNCTIONAL MOBILITY, BALANCE, GAIT, AND ENDURANCE: ICD-10-CM

## 2024-06-26 PROCEDURE — 97112 NEUROMUSCULAR REEDUCATION: CPT | Mod: PN,CQ

## 2024-06-26 PROCEDURE — 97530 THERAPEUTIC ACTIVITIES: CPT | Mod: PN,CQ

## 2024-06-26 PROCEDURE — 97110 THERAPEUTIC EXERCISES: CPT | Mod: PN,CQ

## 2024-06-26 NOTE — PROGRESS NOTES
[facility]   Physical Therapy Treatment Note      Name: [facility] Number: [id number]    Therapy Diagnosis:   Encounter Diagnoses   Name Primary?    Decreased strength of lower extremity Yes    Decreased range of right hip movement     Impaired functional mobility, balance, gait, and endurance      Physician: Alexi Beverly*    Visit Date: 6/26/2024    Physician Orders: PT Eval and Treat   Medical Diagnosis from Referral: Z98.890,Z87.81 (ICD-10-CM) - S/P ORIF (open reduction internal fixation) fracture   Evaluation Date: 4/19/2024  Authorization Period Expiration: 12/31/2024  Plan of Care Expiration: 8/19/2024  Progress Note Due: 5/19/2024  Date of Surgery: 3/5/2024  Visit # / Visits authorized: 15 / 20  FOTO: 2 / 3     Precautions: Standard and Weightbearing WBAT     Time In: 1100  Time Out: 1155  Total Billable Time: 55 minutes     PTA Visit #: 2 / 5     Subjective     Patient reports: his back was hurting him the past couple days, but it is slowly getting better.   He was compliant with home exercise program.  Response to previous treatment: no issues stated   Functional change: increasing activity, using single crutch for long distance  Pain: 0/10  Location: right hip      Objective      Objective Measures updated at progress report unless specified.     Treatment     Camilla received the treatments listed below:      therapeutic exercises to develop strength, endurance, ROM, and flexibility for 30 minutes including:    Upright bike x 8 minutes L5 start of tx session    Precor leg press 60# 3 x 10  Precor knee extension 60# 3 x 10   Precor hamstring curls 60# 3 x 10   Precor hip abduction 60# 3 x 10   Precor hip adduction 50# 3 x 10     neuromuscular re-education activities to improve: Balance, Coordination, Kinesthetic, Sense, and Proprioception for 8 minutes. The following activities were included:    Lateral stepping green theraband 3 x 10 ft each way      therapeutic activities to improve functional performance for 8 minutes, including:    Sled push/pull 50# 10 yards x 3 laps  TRX squats 2 x 10   Deadlift 25lb KB 2 x 10       Patient Education and Home Exercises       Education provided:   - Home Exercise Program  -Healing   -+educated on scar massage/mobs to help with adhesions and desensitize area    Written Home Exercises Provided: yes. Exercises were reviewed and Camilla was able to demonstrate them prior to the end of the session.  Camilla demonstrated good  understanding of the education provided. See Electronic Medical Record under Patient Instructions for exercises provided during therapy sessions    Assessment     Session focused on strengthening with improved overall tolerance. Great improvements made today, continues to have slight limp, but decreasing as well as overall pain. Continue to progress functional strength.     Camilla Is progressing well towards his goals.   Patient prognosis is Good.     Patient will continue to benefit from skilled outpatient physical therapy to address the deficits listed in the problem list box on initial evaluation, provide pt/family education and to maximize pt's level of independence in the home and community environment.     Patient's spiritual, cultural and educational needs considered and pt agreeable to plan of care and goals.     Anticipated barriers to physical therapy: none    Goals:        STG  Weeks/Visits Date Established  Goal Status    Patient will ambulate with good gait mechanics and weight shifting with LRAD to improve functional mobility  5 weeks/ 10 visits  4/19/2024    MET 5/24/2024   2. Patient will increase R hip AROM flexion to >/= 100 deg and abduction >/= 40 deg to improve bed mobility  5 weeks/ 10 visits  4/19/2024    MET 5/24/2024   3. Patient will perform sit <> stand with equal weight bearing to improve transfers  5 weeks/ 10 visits  4/19/2024    MET 5/24/2024   4. Patient will perform SLR  without quad lag to improve return to PLOF  5 weeks/ 10 visits  4/19/2024    MET 5/24/2024   5.Pt will demonstrate and verbalize compliance and independence with HEP to improve therapy outcomes  5 weeks/ 10 visits  4/19/2024    MET 5/24/2024   6. Patient will report </= 5/10 for at worst pain to improve quality of life  5 weeks/ 10 visits 4/19/2024    MET 5/29/2024      LTG Weeks/Visits Date Established  Goal Status    1.Patient will increase R hip AROM flexion to >/= 110 deg and abduction >/= 50 deg to improve bed mobility  10 weeks/ 20 visits  4/19/2024    MET 5/29/204     2. Patient will perform sit <> stand without UE support with equal weight bearing to improve transfers  10 weeks/ 20 visits  4/19/2024       MET 5/29/2024   3.Patient will perform stairs with David to improve his ability to return to work  10 weeks/ 20 visits  4/19/2024    MET 5/29/2024     4.Patient will report </= 3/10 for at worst pain to improve quality of life  10 weeks/ 20 visits  4/19/2024    MET 5/29/2024     5.Patient will ambulate with independence with good weight shifting to improve community mobility 10 weeks/ 20 visits  4/19/2024    Progressing 6/26/2024        Plan     Progress as per patients tolerance. Continue to address range of motion and strength deficits.     Continue to strengthen hip abductors in WB positions.     Nemo Hughes, PTA

## 2024-06-28 ENCOUNTER — CLINICAL SUPPORT (OUTPATIENT)
Dept: REHABILITATION | Facility: HOSPITAL | Age: 44
End: 2024-06-28
Payer: COMMERCIAL

## 2024-06-28 DIAGNOSIS — Z74.09 IMPAIRED FUNCTIONAL MOBILITY, BALANCE, GAIT, AND ENDURANCE: ICD-10-CM

## 2024-06-28 DIAGNOSIS — M25.651 DECREASED RANGE OF RIGHT HIP MOVEMENT: ICD-10-CM

## 2024-06-28 DIAGNOSIS — R29.898 DECREASED STRENGTH OF LOWER EXTREMITY: Primary | ICD-10-CM

## 2024-06-28 PROCEDURE — 97110 THERAPEUTIC EXERCISES: CPT | Mod: PN

## 2024-06-28 PROCEDURE — 97112 NEUROMUSCULAR REEDUCATION: CPT | Mod: PN

## 2024-06-28 PROCEDURE — 97530 THERAPEUTIC ACTIVITIES: CPT | Mod: PN

## 2024-06-28 PROCEDURE — 97140 MANUAL THERAPY 1/> REGIONS: CPT | Mod: PN

## 2024-06-28 NOTE — PROGRESS NOTES
[facility]   Physical Therapy Treatment Note      Name: [facility] Number: [id number]    Therapy Diagnosis:   Encounter Diagnoses   Name Primary?    Decreased strength of lower extremity Yes    Decreased range of right hip movement     Impaired functional mobility, balance, gait, and endurance      Physician: Alexi Beverly*    Visit Date: 6/28/2024    Physician Orders: PT Eval and Treat   Medical Diagnosis from Referral: Z98.890,Z87.81 (ICD-10-CM) - S/P ORIF (open reduction internal fixation) fracture   Evaluation Date: 4/19/2024  Authorization Period Expiration: 12/31/2024  Plan of Care Expiration: 8/19/2024  Progress Note Due: 5/19/2024  Date of Surgery: 3/5/2024  Visit # / Visits authorized: 15 / 20 ( 15/20 per POC) -- see updated POC    FOTO: 2 / 3     Precautions: Standard and Weightbearing WBAT     Time In: 1228  Time Out: 1335  Total Billable Time: 66 minutes     PTA Visit #: 0 / 5     Subjective     Patient reports: his back was really bothering him the other day. It put him in bed for 2 days this was after washing him and his wife car. The other time he really gets pain is after sitting for long periods and being in a position too long at night.     He was compliant with home exercise program.  Response to previous treatment: no issues stated   Functional change: increasing activity, using single crutch for long distance  Pain: 0/10  Location: right hip      Objective      Objective Measures updated at progress report unless specified.     FOTO: 58/100 (61 previously)    Range of motion:    ER: 20 A, 30 P    IR: 30 A, 30 P    Extension: 20 A    Flexion: 110 A, 120 P    SLS: 5 seconds LLE     Hip ABD: 3-/5 MMT       Treatment     Camilla received the treatments listed below:      therapeutic exercises to develop strength, endurance, ROM, and flexibility for 10 minutes including:    Upright bike x 8 minutes L5 start of tx session  Donkey kick 12 lbs 2 x 15  repetitions     NP:   Precor leg press 60# 3 x 10  Precor knee extension 60# 3 x 10   Precor hamstring curls 60# 3 x 10   Precor hip abduction 60# 3 x 10   Precor hip adduction 50# 3 x 10     neuromuscular re-education activities to improve: Balance, Coordination, Kinesthetic, Sense, and Proprioception for 10 minutes. The following activities were included:    Prone hip extension knee flexion 2 x 10   Bridging with march working RLE only 20 repetitions   Cone taps two cones R<>L 3 taps at one time CGA 10 repetitions each way     NP:  Lateral stepping green theraband 3 x 10 ft each way     Camilla received the following manual therapy techniques: Joint mobilizations were applied to the: L hip for 8 minutes, including:    Inferior glide MWM into flexion   Lateral glide grade 3   Long axis distraction     therapeutic activities to improve functional performance for 38 minutes, including:    Prone press ups 10 repetitions   Sled push 60# 20 yards x 2 laps  Sled pull 40# 20 yards x 2 laps   Step ups Fwd and lateral 15 repetitions each way   Squats with pink weighed ball 2 x 10   1 flight of stairs without use of handrails 16 steps   FOTO assessment   Range of motion assessment   Shuttle RLE only 50 lb 3 x 8 repetitions     NP:   Deadlift 25lb KB 2 x 10     Patient Education and Home Exercises       Education provided:   - Home Exercise Program  -Healing   -+educated on scar massage/mobs to help with adhesions and desensitize area    Written Home Exercises Provided: yes. Exercises were reviewed and Camilla was able to demonstrate them prior to the end of the session.  Camilla demonstrated good  understanding of the education provided. See Electronic Medical Record under Patient Instructions for exercises provided during therapy sessions    Assessment     Arrives with reports of improvement in R hip groin pain that is typically only present after prolong static positions. He arrived without pain and denied pain throughout tx  session. He did report some lateral hip burning with SLS indicating weakness of glut med. His range of motion and strength has improved since evaluation. He is able to navigate stairs with independence without use of handrails. He continues to demonstrate decreased hip ER strength and decreased A/PROM of ER, other motions are WFL. He plans to return to work on July 8th. He continues to demonstrate lateral trunk lean with ambulation due to hip glut med weakness but this is gradually improving.     Camilla Is progressing well towards his goals.   Patient prognosis is Good.     Patient will continue to benefit from skilled outpatient physical therapy to address the deficits listed in the problem list box on initial evaluation, provide pt/family education and to maximize pt's level of independence in the home and community environment.     Patient's spiritual, cultural and educational needs considered and pt agreeable to plan of care and goals.     Anticipated barriers to physical therapy: none    Goals:     STG  Weeks/Visits Date Established  Goal Status    Patient will ambulate with good gait mechanics and weight shifting with LRAD to improve functional mobility  5 weeks/ 10 visits  4/19/2024    MET 5/24/2024   2. Patient will increase R hip AROM flexion to >/= 100 deg and abduction >/= 40 deg to improve bed mobility  5 weeks/ 10 visits  4/19/2024    MET 5/24/2024   3. Patient will perform sit <> stand with equal weight bearing to improve transfers  5 weeks/ 10 visits  4/19/2024    MET 5/24/2024   4. Patient will perform SLR without quad lag to improve return to PLOF  5 weeks/ 10 visits  4/19/2024    MET 5/24/2024   5.Pt will demonstrate and verbalize compliance and independence with HEP to improve therapy outcomes  5 weeks/ 10 visits  4/19/2024    MET 5/24/2024   6. Patient will report </= 5/10 for at worst pain to improve quality of life  5 weeks/ 10 visits 4/19/2024    MET 5/29/2024      LTG Weeks/Visits Date  Established  Goal Status    1.Patient will increase R hip AROM flexion to >/= 110 deg and abduction >/= 50 deg to improve bed mobility  10 weeks/ 20 visits  4/19/2024    MET 5/29/204     2. Patient will perform sit <> stand without UE support with equal weight bearing to improve transfers  10 weeks/ 20 visits  4/19/2024       MET 5/29/2024   3.Patient will perform stairs with David to improve his ability to return to work  10 weeks/ 20 visits  4/19/2024    MET 5/29/2024     4.Patient will report </= 3/10 for at worst pain to improve quality of life  10 weeks/ 20 visits  4/19/2024    MET 5/29/2024     5.Patient will ambulate with independence with good weight shifting to improve community mobility 10 weeks/ 20 visits  4/19/2024    MET 5/29/2024        Plan     Progress as per patients tolerance. Continue to address range of motion and strength deficits.     Continue to strengthen hip abductors in WB positions.     Marie Zayas, PT

## 2024-06-28 NOTE — PLAN OF CARE
[facility]  Physical Therapy Plan of Care Note     Name: [facility] Number: [id number]    Therapy Diagnosis:   Encounter Diagnoses   Name Primary?    Decreased strength of lower extremity Yes    Decreased range of right hip movement     Impaired functional mobility, balance, gait, and endurance      Physician: Alexi Beverly*    Visit Date: 6/28/2024    Physician Orders: PT Eval and Treat   Medical Diagnosis from Referral: Z98.890,Z87.81 (ICD-10-CM) - S/P ORIF (open reduction internal fixation) fracture   Evaluation Date: 4/19/2024  Authorization Period Expiration: 12/31/2024  Plan of Care Expiration: 8/19/2024  Progress Note Due: 5/19/2024  Date of Surgery: 3/5/2024  Visit # / Visits authorized: 15 / 20  FOTO: 2 / 3    Precautions: Standard  Functional Level Prior to Evaluation:  independent     SUBJECTIVE     Update:     his back was really bothering him the other day. It put him in bed for 2 days this was after washing him and his wife car. The other time he really gets pain is after sitting for long periods and being in a position too long at night.      OBJECTIVE     Update:     FOTO: 58/100 (61 previously)     Range of motion:               ER: 20 A, 30 P               IR: 30 A, 30 P               Extension: 20 A               Flexion: 110 A, 120 P    SLS: 5 seconds LLE    Hip ABD: 3-/5 MMT    ASSESSMENT     Update:     Patient has improved significantly since evaluation. He demonstrates improvement in L hip strength and range of motion which has improved his functional and community mobility. He ambulate without AD with gait deficits including L lateral trunk lean during stance phase due to glut med weakness. However he is able to navigate stairs with independence without pain and good stability. He continues to demonstrate most limitation into ER with range of motion and strength. He also continues to demonstrate decreased posterior/inferior glide with squatting  of L hip. SLS continues to be limited increasing patient risk for falls. He will continue to benefit from skilled Physical Therapist services to address above deficits to improve functional mobility and quality of life. He plans on returning to work on July 8th as .     Previous Short Term Goals Status:   met   New Short Term Goals Status:   met  Long Term Goal Status: modified:  updated   Reasons for Recertification of Therapy:   continuation of decreased L hip range of motion and strength, L lateral trunk lean due to glut med weakness     GOALS    New goals: 6/28/2024    Patient will increase R hip AROM ER to >/= 30 deg to improve functional mobility   Patient will ambulate without lateral trunk lean to improve community mobility   Patient will increase L hip abduction to >/= 4/5 to improve gait mechanics   Patient will perform LLE SLS for >/= 15 to decrease risk for falls    Patient will report >/= 65% on FOTO to improve return to PLOF     STG  Weeks/Visits Date Established  Goal Status    Patient will ambulate with good gait mechanics and weight shifting with LRAD to improve functional mobility  5 weeks/ 10 visits  4/19/2024    MET 5/24/2024   2. Patient will increase R hip AROM flexion to >/= 100 deg and abduction >/= 40 deg to improve bed mobility  5 weeks/ 10 visits  4/19/2024    MET 5/24/2024   3. Patient will perform sit <> stand with equal weight bearing to improve transfers  5 weeks/ 10 visits  4/19/2024    MET 5/24/2024   4. Patient will perform SLR without quad lag to improve return to PLOF  5 weeks/ 10 visits  4/19/2024    MET 5/24/2024   5.Pt will demonstrate and verbalize compliance and independence with HEP to improve therapy outcomes  5 weeks/ 10 visits  4/19/2024    MET 5/24/2024   6. Patient will report </= 5/10 for at worst pain to improve quality of life  5 weeks/ 10 visits 4/19/2024    MET 5/29/2024      LTG Weeks/Visits Date Established  Goal Status    1.Patient will  increase R hip AROM flexion to >/= 110 deg and abduction >/= 50 deg to improve bed mobility  10 weeks/ 20 visits  4/19/2024    MET 5/29/204      2. Patient will perform sit <> stand without UE support with equal weight bearing to improve transfers  10 weeks/ 20 visits  4/19/2024       MET 5/29/2024   3.Patient will perform stairs with David to improve his ability to return to work  10 weeks/ 20 visits  4/19/2024    MET 5/29/2024      4.Patient will report </= 3/10 for at worst pain to improve quality of life  10 weeks/ 20 visits  4/19/2024    MET 5/29/2024      5.Patient will ambulate with independence with good weight shifting to improve community mobility 10 weeks/ 20 visits  4/19/2024    MET 5/29/2024        PLAN     Updated Certification Period: 6/28/2024 to 10/28/2024   Recommended Treatment Plan: 2 times per week for 2 weeks then 1x/wk for 6 weeks:  Gait Training, Manual Therapy, Moist Heat/ Ice, Neuromuscular Re-ed, Patient Education, Therapeutic Activities, and Therapeutic Exercise  Other Recommendations: none     Marie Zayas, PT

## 2024-07-02 ENCOUNTER — CLINICAL SUPPORT (OUTPATIENT)
Dept: REHABILITATION | Facility: HOSPITAL | Age: 44
End: 2024-07-02
Payer: COMMERCIAL

## 2024-07-02 DIAGNOSIS — M25.651 DECREASED RANGE OF RIGHT HIP MOVEMENT: ICD-10-CM

## 2024-07-02 DIAGNOSIS — Z74.09 IMPAIRED FUNCTIONAL MOBILITY, BALANCE, GAIT, AND ENDURANCE: ICD-10-CM

## 2024-07-02 DIAGNOSIS — R29.898 DECREASED STRENGTH OF LOWER EXTREMITY: Primary | ICD-10-CM

## 2024-07-02 PROCEDURE — 97530 THERAPEUTIC ACTIVITIES: CPT | Mod: PN

## 2024-07-02 PROCEDURE — 97112 NEUROMUSCULAR REEDUCATION: CPT | Mod: PN

## 2024-07-02 PROCEDURE — 97140 MANUAL THERAPY 1/> REGIONS: CPT | Mod: PN

## 2024-07-02 PROCEDURE — 97110 THERAPEUTIC EXERCISES: CPT | Mod: PN

## 2024-07-02 NOTE — PROGRESS NOTES
[facility]   Physical Therapy Treatment Note      Name: Camilla [facility] Number: [id number]    Therapy Diagnosis:   Encounter Diagnoses   Name Primary?    Decreased strength of lower extremity Yes    Decreased range of right hip movement     Impaired functional mobility, balance, gait, and endurance      Physician: Alexi Beverly*    Visit Date: 7/2/2024    Physician Orders: PT Eval and Treat   Medical Diagnosis from Referral: Z98.890,Z87.81 (ICD-10-CM) - S/P ORIF (open reduction internal fixation) fracture   Evaluation Date: 4/19/2024  Authorization Period Expiration: 12/31/2024  Plan of Care Expiration: 10/28/2024  Progress Note Due: 5/19/2024  Date of Surgery: 3/5/2024  Visit # / Visits authorized: 16 / 20 ( 1/10 per updated POC)   FOTO: 3 / 3     Precautions: Standard and Weightbearing WBAT     Time In: 1105 AM  Time Out: 1201 PM  Total Billable Time: 56 minutes     PTA Visit #: 0 / 5     Subjective     Patient reports: continuation of anterior R hip pain.  He only feels this at night when he is laying on his back. Patient has been non compliant with HEP.     He was compliant with home exercise program.  Response to previous treatment: no issues stated   Functional change: increasing activity, using single crutch for long distance  Pain: 0/10  Location: right hip      Objective      Objective Measures updated at progress report unless specified.     Treatment     Camilla received the treatments listed below:      therapeutic exercises to develop strength, endurance, ROM, and flexibility for 8 minutes including:    Upright bike x 8 minutes L5 start of tx session    NP:   Donkey kick 12 lbs 2 x 15 repetitions   Precor leg press 60# 3 x 10  Precor knee extension 60# 3 x 10   Precor hamstring curls 60# 3 x 10   Precor hip abduction 60# 3 x 10   Precor hip adduction 50# 3 x 10     neuromuscular re-education activities to improve: Balance, Coordination, Kinesthetic, Sense, and  Proprioception for 17 minutes. The following activities were included:    Prone hip extension knee flexion 3 x 8 repetitions   Bridging with march working RLE only 15 repetitions   Cone taps two cones R<>L 3 taps at one time CGA 10 repetitions each way - NP  Standing hip hike 3 x 8 repetitions     Camilla received the following manual therapy techniques: Joint mobilizations were applied to the: L hip for 8 minutes, including:    Inferior glide MWM into flexion   Lateral glide grade 3   Long axis distraction   PA/ anterior glide with MWM     therapeutic activities to improve functional performance for 23 minutes, including:    Prone press ups 2 x 10 repetitions   Step ups Fwd  15 repetitions each way   Squats with 10 lb KB 3 x 10   Shuttle RLE only 62 lb 3 x 8 repetitions   Deadlift 25lb KB 2 x 10   Lateral stepping green theraband 3 x 10 ft each way   Standing extensions over the counter 15 repetitions     NP:  1 flight of stairs without use of handrails 16 steps   Sled push 60# 20 yards x 2 laps  Sled pull 40# 20 yards x 2 laps     Patient Education and Home Exercises       Education provided:   - Home Exercise Program  -Healing   -+educated on scar massage/mobs to help with adhesions and desensitize area    Written Home Exercises Provided: yes. Exercises were reviewed and Camilla was able to demonstrate them prior to the end of the session.  Camilla demonstrated good  understanding of the education provided. See Electronic Medical Record under Patient Instructions for exercises provided during therapy sessions    Assessment     Arrives with complaints of R anterior hip pain at night when he is laying on his back. This has been affecting his sleep quality. He tolerated tx session well. Standing extension baseline lead to anterior R hip pain / pulling sensation. This improved following repeated press ups. He responded well to manual with anterior hip stretching with anterior glide. He continues to demonstrate limited R  hip ER with passive and active range of motion. He continues to demonstrate slight R hip elevation / hike with squatting indicating decrease R posterior glide with squatting position. He required cues for proper form with deadlifts. Physical Therapist educated patient on importance of HEP to improve therapy outcomes and progress.     Camilla Is progressing well towards his goals.   Patient prognosis is Good.     Patient will continue to benefit from skilled outpatient physical therapy to address the deficits listed in the problem list box on initial evaluation, provide pt/family education and to maximize pt's level of independence in the home and community environment.     Patient's spiritual, cultural and educational needs considered and pt agreeable to plan of care and goals.     Anticipated barriers to physical therapy: none    GOALS     New goals: 6/28/2024     Patient will increase R hip AROM ER to >/= 30 deg to improve functional mobility   Patient will ambulate without lateral trunk lean to improve community mobility   Patient will increase L hip abduction to >/= 4/5 to improve gait mechanics   Patient will perform LLE SLS for >/= 15 to decrease risk for falls    Patient will report >/= 65% on FOTO to improve return to PLOF         Plan     Progress as per patients tolerance. Continue to address range of motion and strength deficits.     Continue to strengthen hip abductors in WB positions.    Marie Zayas, PT

## 2024-07-09 ENCOUNTER — OCCUPATIONAL HEALTH (OUTPATIENT)
Dept: URGENT CARE | Facility: CLINIC | Age: 44
End: 2024-07-09

## 2025-02-14 ENCOUNTER — HOSPITAL ENCOUNTER (EMERGENCY)
Facility: HOSPITAL | Age: 45
Discharge: HOME | End: 2025-02-14
Attending: EMERGENCY MEDICINE
Payer: COMMERCIAL

## 2025-02-14 VITALS
DIASTOLIC BLOOD PRESSURE: 80 MMHG | HEART RATE: 70 BPM | HEIGHT: 69 IN | BODY MASS INDEX: 38.95 KG/M2 | TEMPERATURE: 98 F | OXYGEN SATURATION: 95 % | SYSTOLIC BLOOD PRESSURE: 133 MMHG | WEIGHT: 263 LBS | RESPIRATION RATE: 18 BRPM

## 2025-02-14 DIAGNOSIS — M79.642 LEFT HAND PAIN: Primary | ICD-10-CM

## 2025-02-14 PROCEDURE — 96372 THER/PROPH/DIAG INJ SC/IM: CPT | Performed by: EMERGENCY MEDICINE

## 2025-02-14 PROCEDURE — 63600175 PHARM REV CODE 636 W HCPCS: Mod: TB,JZ | Performed by: EMERGENCY MEDICINE

## 2025-02-14 PROCEDURE — 99284 EMERGENCY DEPT VISIT MOD MDM: CPT | Mod: 25

## 2025-02-14 RX ORDER — METHYLPREDNISOLONE 4 MG/1
TABLET ORAL
Qty: 1 EACH | Refills: 0 | Status: SHIPPED | OUTPATIENT
Start: 2025-02-14 | End: 2025-03-07

## 2025-02-14 RX ORDER — KETOROLAC TROMETHAMINE 30 MG/ML
15 INJECTION, SOLUTION INTRAMUSCULAR; INTRAVENOUS
Status: COMPLETED | OUTPATIENT
Start: 2025-02-14 | End: 2025-02-14

## 2025-02-14 RX ADMIN — KETOROLAC TROMETHAMINE 15 MG: 30 INJECTION, SOLUTION INTRAMUSCULAR; INTRAVENOUS at 11:02

## 2025-02-15 NOTE — ED PROVIDER NOTES
Encounter Date: 2/14/2025       History     Chief Complaint   Patient presents with    Hand Pain     Left hand pain x 5 days.  Pt. Reports pain to 2nd finger and radiates to wrist.  No trauma reported.      Patient presents emergency department with reported left hand pain onset of symptoms over last 2 days has some swelling to the hand as well no fever chills no known injury he denies any previous injury to the hand no history of gout        Review of patient's allergies indicates:  No Known Allergies  Past Medical History:   Diagnosis Date    Hypertension      Past Surgical History:   Procedure Laterality Date    BONY PELVIS SURGERY Right      Family History   Problem Relation Name Age of Onset    Hypertension Father       Social History     Tobacco Use    Smoking status: Never    Smokeless tobacco: Never   Substance Use Topics    Alcohol use: Yes     Comment: socailly     Drug use: No     Review of Systems   Respiratory:  Negative for cough and shortness of breath.    Musculoskeletal:  Positive for arthralgias.       Physical Exam     Initial Vitals [02/14/25 2223]   BP Pulse Resp Temp SpO2   117/71 67 18 98.1 °F (36.7 °C) 97 %      MAP       --         Physical Exam    Constitutional: He appears well-developed and well-nourished. No distress.   HENT:   Head: Normocephalic and atraumatic.   Right Ear: External ear normal.   Left Ear: External ear normal. Mouth/Throat: Oropharynx is clear and moist.   Eyes: Pupils are equal, round, and reactive to light.   Neck: Neck supple.   Normal range of motion.  Cardiovascular:  Normal rate, regular rhythm, S1 normal, S2 normal and intact distal pulses.           Pulmonary/Chest: No respiratory distress.   Musculoskeletal:         General: Tenderness present. Normal range of motion.      Cervical back: Normal range of motion and neck supple.      Comments: Not swelling increased warmth tenderness to the dorsum of the left hand at the index metacarpal cap refill less than 2  seconds 2+ radial pulses bilaterally no erythema no induration     Neurological: He is alert and oriented to person, place, and time. GCS score is 15. GCS eye subscore is 4. GCS verbal subscore is 5. GCS motor subscore is 6.   Skin: Skin is warm and dry. Capillary refill takes less than 2 seconds. No rash noted. No erythema.   Psychiatric: He has a normal mood and affect. His behavior is normal.         ED Course   Procedures  Labs Reviewed   HEPATITIS C ANTIBODY   HIV 1 / 2 ANTIBODY          Imaging Results              X-Ray Hand 3 View Left (In process)                      Medications   ketorolac injection 15 mg (has no administration in time range)     Medical Decision Making  Radiographs show no evidence of fracture dislocation will treat as likely inflammatory arthritis he has received a dose of Toradol emergency department will provide patient with a Medrol Dosepak to began outpatient follow up with Orthopedics return to the ER for any worsened symptoms or new symptoms    Amount and/or Complexity of Data Reviewed  Radiology: ordered.    Risk  Prescription drug management.                                      Clinical Impression:  Final diagnoses:  [M79.642] Left hand pain (Primary)          ED Disposition Condition    Discharge Stable          ED Prescriptions       Medication Sig Dispense Start Date End Date Auth. Provider    methylPREDNISolone (MEDROL DOSEPACK) 4 mg tablet Use as directed until gone 1 each 2/14/2025 3/7/2025 Usman Warner MD          Follow-up Information       Follow up With Specialties Details Why Contact Armando Bloom MD Orthopedic Surgery, Surgery, Sports Medicine Call in 1 day for further evaluation and treatment [address] [facility] [address]  [place] [address]  [phone]               Usman Warner MD  02/14/25 3592

## 2025-07-04 ENCOUNTER — OCCUPATIONAL HEALTH (OUTPATIENT)
Dept: URGENT CARE | Facility: CLINIC | Age: 45
End: 2025-07-04

## 2025-07-04 DIAGNOSIS — Z00.00 ROUTINE GENERAL MEDICAL EXAMINATION AT A HEALTH CARE FACILITY: Primary | ICD-10-CM
